# Patient Record
Sex: FEMALE | Race: OTHER | Employment: UNEMPLOYED | URBAN - METROPOLITAN AREA
[De-identification: names, ages, dates, MRNs, and addresses within clinical notes are randomized per-mention and may not be internally consistent; named-entity substitution may affect disease eponyms.]

---

## 2020-07-01 ENCOUNTER — OFFICE VISIT (OUTPATIENT)
Dept: SURGERY | Age: 45
End: 2020-07-01

## 2020-07-01 VITALS
DIASTOLIC BLOOD PRESSURE: 72 MMHG | WEIGHT: 170 LBS | HEART RATE: 75 BPM | BODY MASS INDEX: 31.28 KG/M2 | RESPIRATION RATE: 16 BRPM | SYSTOLIC BLOOD PRESSURE: 106 MMHG | OXYGEN SATURATION: 98 % | HEIGHT: 62 IN | TEMPERATURE: 98.2 F

## 2020-07-01 DIAGNOSIS — K43.2 INCISIONAL HERNIA, WITHOUT OBSTRUCTION OR GANGRENE: Primary | ICD-10-CM

## 2020-07-01 NOTE — PROGRESS NOTES
1. Have you been to the ER, urgent care clinic since your last visit? Hospitalized since your last visit? No    2. Have you seen or consulted any other health care providers outside of the [address] [place] [address] since your last visit? Include any pap smears or colon screening.  No

## 2020-07-02 NOTE — PROGRESS NOTES
[facility] at [facility] Surgery History and Physical    History of Present Illness:      Gabino Echevarria is a 39 y.o. female who has a past surgical history of open colectomy for colon cancer. This surgery was done in her home country of [place] a number of years ago. She is not sure which side the colon was taken on. Since then she has noticed an enlarging bulge in the midline. She has some pain and notices the bulge getting larger over time. The pain when she has it is a 4-5 out of 10 at worst.  She has not had any nausea vomiting changes in bowel movements. The bulge in the pain appear to be worse with straining lifting and bending over. PMH -colon cancer    PSH -open colon resection    No current outpatient medications on file. No Known Allergies    Social History     Socioeconomic History    Marital status: SINGLE     Spouse name: Not on file    Number of children: Not on file    Years of education: Not on file    Highest education level: Not on file   Occupational History    Not on file   Social Needs    Financial resource strain: Not on file    Food insecurity     Worry: Not on file     Inability: Not on file    Transportation needs     Medical: Not on file     Non-medical: Not on file   Tobacco Use    Smoking status: Former Smoker     Packs/day: 0.25    Smokeless tobacco: Never Used   Substance and Sexual Activity    Alcohol use:  Yes    Drug use: Never    Sexual activity: Yes   Lifestyle    Physical activity     Days per week: Not on file     Minutes per session: Not on file    Stress: Not on file   Relationships    Social connections     Talks on phone: Not on file     Gets together: Not on file     Attends Rastafarian service: Not on file     Active member of club or organization: Not on file     Attends meetings of clubs or organizations: Not on file     Relationship status: Not on file    Intimate partner violence     Fear of current or ex partner: Not on file     Emotionally abused: Not on file     Physically abused: Not on file     Forced sexual activity: Not on file   Other Topics Concern    Not on file   Social History Narrative    Not on file       No family history on file. ROS   Constitutional: negative  Ears, Nose, Mouth, Throat, and Face: negative  Respiratory: negative  Cardiovascular: negative  Gastrointestinal: positive for abdominal pain  Genitourinary:negative  Integument/Breast: negative  Hematologic/Lymphatic: negative  Behavioral/Psychiatric: negative  Allergic/Immunologic: negative      Physical Exam:     Visit Vitals  /72 (BP 1 Location: Left arm, BP Patient Position: Sitting)   Pulse 75   Temp 98.2 °F (36.8 °C) (Oral)   Resp 16   Ht 5' 2\" (1.575 m)   Wt 170 lb (77.1 kg)   SpO2 98%   BMI 31.09 kg/m²       General - alert and oriented, no apparent distress  HEENT - no jaundice, no hearing imparement  Pulm - CTAB, no C/W/R  CV - RRR, no M/R/G  Abd -soft, nondistended, bowel sounds present, midline incision is large with a bulge near and above the umbilicus that is soft and somewhat reducible, mild tenderness to palpation cannot exactly feel the hernia defect but feels about 4 cm or so in size, I cannot feel any other hernias along the length of the incision  Ext - pulses intact in UE and LE bilaterally, no edema  Skin - supple, no rashes  Psychiatric - normal affect, good mood    Labs  None    Imaging  None  I have reviewed and agree with all of the pertinent images    Assessment:     Wilfred Street is a 39 y.o. female with incisional hernia    Recommendations:     1. She certainly has an incisional hernia from her previous colon resection. We do not know which side of the colon or which part was taken. She will need a CT scan to figure out the size of the hernia which will determine what kind of surgery I do.   Given the size and location more than likely will be able to repair this robotically but the technique will be determined by the size and location. She also may have other smaller hernias in the area of the primary hernia due to her large midline incision. I will have her come back and see me in the office for office visit. She is [place]-speaking only and will need in person visit. Manjeet Parks MD    Ms. Oni Vicente has a reminder for a \"due or due soon\" health maintenance. I have asked that she contact her primary care provider for follow-up on this health maintenance.

## 2020-07-10 ENCOUNTER — HOSPITAL ENCOUNTER (OUTPATIENT)
Dept: CT IMAGING | Age: 45
Discharge: HOME | End: 2020-07-10
Attending: SURGERY
Payer: SUBSIDIZED

## 2020-07-10 DIAGNOSIS — K43.2 INCISIONAL HERNIA, WITHOUT OBSTRUCTION OR GANGRENE: ICD-10-CM

## 2020-07-10 LAB — CREAT BLD-MCNC: 0.4 MG/DL (ref 0.6–1.3)

## 2020-07-10 PROCEDURE — 74011000258 HC RX REV CODE- 258: Performed by: RADIOLOGY

## 2020-07-10 PROCEDURE — 82565 ASSAY OF CREATININE: CPT

## 2020-07-10 PROCEDURE — 74177 CT ABD & PELVIS W/CONTRAST: CPT

## 2020-07-10 PROCEDURE — 74011636320 HC RX REV CODE- 636/320: Performed by: RADIOLOGY

## 2020-07-10 RX ORDER — SODIUM CHLORIDE 0.9 % (FLUSH) 0.9 %
10 SYRINGE (ML) INJECTION
Status: COMPLETED | OUTPATIENT
Start: 2020-07-10 | End: 2020-07-10

## 2020-07-10 RX ADMIN — SODIUM CHLORIDE 100 ML: 900 INJECTION, SOLUTION INTRAVENOUS at 08:00

## 2020-07-10 RX ADMIN — Medication 10 ML: at 08:00

## 2020-07-10 RX ADMIN — IOPAMIDOL 100 ML: 755 INJECTION, SOLUTION INTRAVENOUS at 08:00

## 2020-07-21 ENCOUNTER — TELEPHONE (OUTPATIENT)
Dept: SURGERY | Age: 45
End: 2020-07-21

## 2020-08-05 ENCOUNTER — OFFICE VISIT (OUTPATIENT)
Dept: SURGERY | Age: 45
End: 2020-08-05
Payer: SUBSIDIZED

## 2020-08-05 VITALS
RESPIRATION RATE: 18 BRPM | SYSTOLIC BLOOD PRESSURE: 131 MMHG | WEIGHT: 168 LBS | HEIGHT: 62 IN | TEMPERATURE: 98.2 F | HEART RATE: 71 BPM | OXYGEN SATURATION: 98 % | BODY MASS INDEX: 30.91 KG/M2 | DIASTOLIC BLOOD PRESSURE: 82 MMHG

## 2020-08-05 DIAGNOSIS — K43.2 INCISIONAL HERNIA, WITHOUT OBSTRUCTION OR GANGRENE: Primary | ICD-10-CM

## 2020-08-05 PROCEDURE — 99214 OFFICE O/P EST MOD 30 MIN: CPT | Performed by: SURGERY

## 2020-08-05 NOTE — PROGRESS NOTES
1. Have you been to the ER, urgent care clinic since your last visit? Hospitalized since your last visit? No    2. Have you seen or consulted any other health care providers outside of the [address] [place] [address] since your last visit? Include any pap smears or colon screening.  No

## 2020-08-14 NOTE — PROGRESS NOTES
[facility] at [address] [place] [address] Surgery History and Physical    History of Present Illness:      Kristine Bermudez is a 39 y.o. female who has a past surgical history of open colectomy for colon cancer. This surgery was done in her home country of [place] a number of years ago. She is not sure which side the colon was taken on. Since then she has noticed an enlarging bulge in the midline. She has some pain and notices the bulge getting larger over time. The pain when she has it is a 4-5 out of 10 at worst.  She has not had any nausea vomiting changes in bowel movements. The bulge in the pain appear to be worse with straining lifting and bending over. She is here to follow-up after her CT scan and talk about surgery    Past medical history-none    Past surgical history-colectomy done in her home country of [place]    No current outpatient medications on file. No Known Allergies    Social History     Socioeconomic History    Marital status: SINGLE     Spouse name: Not on file    Number of children: Not on file    Years of education: Not on file    Highest education level: Not on file   Occupational History    Not on file   Social Needs    Financial resource strain: Not on file    Food insecurity     Worry: Not on file     Inability: Not on file    Transportation needs     Medical: Not on file     Non-medical: Not on file   Tobacco Use    Smoking status: Former Smoker     Packs/day: 0.25    Smokeless tobacco: Never Used   Substance and Sexual Activity    Alcohol use:  Yes    Drug use: Never    Sexual activity: Yes   Lifestyle    Physical activity     Days per week: Not on file     Minutes per session: Not on file    Stress: Not on file   Relationships    Social connections     Talks on phone: Not on file     Gets together: Not on file     Attends Muslim service: Not on file     Active member of club or organization: Not on file     Attends meetings of clubs or organizations: Not on file     Relationship status: Not on file    Intimate partner violence     Fear of current or ex partner: Not on file     Emotionally abused: Not on file     Physically abused: Not on file     Forced sexual activity: Not on file   Other Topics Concern    Not on file   Social History Narrative    Not on file       No family history on file. ROS   Constitutional: negative  Ears, Nose, Mouth, Throat, and Face: negative  Respiratory: negative  Cardiovascular: negative  Gastrointestinal: positive for abdominal pain  Genitourinary:negative  Integument/Breast: negative  Hematologic/Lymphatic: negative  Behavioral/Psychiatric: negative  Allergic/Immunologic: negative      Physical Exam:     Visit Vitals  /82 (BP 1 Location: Left arm, BP Patient Position: Sitting)   Pulse 71   Temp 98.2 °F (36.8 °C) (Oral)   Resp 18   Ht 5' 2\" (1.575 m)   Wt 168 lb (76.2 kg)   SpO2 98%   BMI 30.73 kg/m²       General - alert and oriented, no apparent distress  HEENT - no jaundice, no hearing imparement  Pulm - CTAB, no C/W/R  CV - RRR, no M/R/G  Abd -soft, nondistended, bowel sounds present, midline incisional hernia with possibly a few small defects  Ext - pulses intact in UE and LE bilaterally, no edema  Skin - supple, no rashes  Psychiatric - normal affect, good mood    Labs  None    Imaging  CT - FINDINGS:   LOWER THORAX: No significant abnormality in the incidentally imaged lower chest.  LIVER: No mass. There is hepatic steatosis  BILIARY TREE: Gallbladder surgically absent CBD is not dilated. SPLEEN: within normal limits. PANCREAS: No mass or ductal dilatation. ADRENALS: Unremarkable. KIDNEYS: No mass, calculus, or hydronephrosis. STOMACH: Unremarkable. SMALL BOWEL: No dilatation or wall thickening. COLON: Patient is status post resection of the splenic flexure  APPENDIX: Unremarkable. PERITONEUM: No ascites or pneumoperitoneum. RETROPERITONEUM: No lymphadenopathy or aortic aneurysm.   REPRODUCTIVE ORGANS: Patient status post hysterectomy  URINARY BLADDER: No mass or calculus. BONES: No destructive bone lesion. ABDOMINAL WALL: There are 5 ventral wall hernias superior to the umbilicus.      In the upper abdomen, there is a defect in the abdominal wall midline measuring  1.8 x 0.9 cm and contains fat.      Immediately inferior to this, there is another defect in the abdominal wall  measuring 2 x 1.7 cm and contains fat.     Inferior to this in the midline, there is a defect measuring 1.9 x 1.9 and  contains fat.     Inferior to this, there is a defect in the abdominal wall in the midline  measuring 1.3 x 0.5 cm which contains fat.     The largest defect is immediately superior and just to the left of the umbilicus  measuring 3.3 x 2.8 cm and contains fat.        ADDITIONAL COMMENTS: N/A     IMPRESSION  IMPRESSION:     1. There are 5 ventral wall hernias in the abdomen containing fat with the  largest immediately superior and just to left of the umbilicus measuring 3.3 x  2.8 cm. I have reviewed and agree with all of the pertinent images    Assessment:     Alec Boles is a 39 y.o. female with multiple incisional hernias    Recommendations:     1. She has multiple incisional hernias over the length of her incision the largest defect measures about 3 cm or so in size and go over a length of about 13 to 14 cm. I will plan on doing a robotic incisional hernia repair with mesh. I hope to do this with a preperitoneal approach for the hernia repair. If needed I can do is IPOM as well. I have discussed the above procedure with the patient in detail. We reviewed the benefits and possible complications of the surgery which include bleeding, infection, damage to adjacent organs, venous thromboembolism, need for repeat surgery, death and other unforseen complications. The patient agreed to proceed with the surgery. Jaclyn Zendejas MD    Ms. Jose G Judge has a reminder for a \"due or due soon\" health maintenance.  I have asked that she contact her primary care provider for follow-up on this health maintenance.

## 2020-08-19 ENCOUNTER — TELEPHONE (OUTPATIENT)
Dept: SURGERY | Age: 45
End: 2020-08-19

## 2020-08-19 NOTE — TELEPHONE ENCOUNTER
Confirmed surgery information / details with patient using  service rep name: [facility] ID# [id number]; patient verbalizes understanding. Letter sent.

## 2020-10-05 ENCOUNTER — HOSPITAL ENCOUNTER (OUTPATIENT)
Dept: PREADMISSION TESTING | Age: 45
Discharge: HOME | End: 2020-10-05
Payer: SUBSIDIZED

## 2020-10-05 VITALS
HEART RATE: 101 BPM | TEMPERATURE: 98.1 F | DIASTOLIC BLOOD PRESSURE: 82 MMHG | BODY MASS INDEX: 28.87 KG/M2 | HEIGHT: 63 IN | WEIGHT: 162.92 LBS | SYSTOLIC BLOOD PRESSURE: 148 MMHG

## 2020-10-05 LAB
ANION GAP SERPL CALC-SCNC: 6 MMOL/L (ref 5–15)
BASOPHILS # BLD: 0 K/UL (ref 0–0.1)
BASOPHILS NFR BLD: 1 % (ref 0–1)
BUN SERPL-MCNC: 10 MG/DL (ref 6–20)
BUN/CREAT SERPL: 14 (ref 12–20)
CALCIUM SERPL-MCNC: 10.3 MG/DL (ref 8.5–10.1)
CHLORIDE SERPL-SCNC: 101 MMOL/L (ref 97–108)
CO2 SERPL-SCNC: 25 MMOL/L (ref 21–32)
COMMENT, HOLDF: NORMAL
CREAT SERPL-MCNC: 0.69 MG/DL (ref 0.55–1.02)
DIFFERENTIAL METHOD BLD: NORMAL
EOSINOPHIL # BLD: 0.1 K/UL (ref 0–0.4)
EOSINOPHIL NFR BLD: 3 % (ref 0–7)
ERYTHROCYTE [DISTWIDTH] IN BLOOD BY AUTOMATED COUNT: 12.5 % (ref 11.5–14.5)
EST. AVERAGE GLUCOSE BLD GHB EST-MCNC: 214 MG/DL
GLUCOSE SERPL-MCNC: 261 MG/DL (ref 65–100)
HBA1C MFR BLD: 9.1 % (ref 4–5.6)
HCT VFR BLD AUTO: 37.4 % (ref 35–47)
HGB BLD-MCNC: 12.5 G/DL (ref 11.5–16)
IMM GRANULOCYTES # BLD AUTO: 0 K/UL (ref 0–0.04)
IMM GRANULOCYTES NFR BLD AUTO: 0 % (ref 0–0.5)
LYMPHOCYTES # BLD: 1.6 K/UL (ref 0.8–3.5)
LYMPHOCYTES NFR BLD: 31 % (ref 12–49)
MCH RBC QN AUTO: 28.2 PG (ref 26–34)
MCHC RBC AUTO-ENTMCNC: 33.4 G/DL (ref 30–36.5)
MCV RBC AUTO: 84.2 FL (ref 80–99)
MONOCYTES # BLD: 0.3 K/UL (ref 0–1)
MONOCYTES NFR BLD: 5 % (ref 5–13)
NEUTS SEG # BLD: 3.1 K/UL (ref 1.8–8)
NEUTS SEG NFR BLD: 60 % (ref 32–75)
NRBC # BLD: 0 K/UL (ref 0–0.01)
NRBC BLD-RTO: 0 PER 100 WBC
PLATELET # BLD AUTO: 294 K/UL (ref 150–400)
PMV BLD AUTO: 12.7 FL (ref 8.9–12.9)
POTASSIUM SERPL-SCNC: 4.2 MMOL/L (ref 3.5–5.1)
RBC # BLD AUTO: 4.44 M/UL (ref 3.8–5.2)
SAMPLES BEING HELD,HOLD: NORMAL
SODIUM SERPL-SCNC: 132 MMOL/L (ref 136–145)
WBC # BLD AUTO: 5.2 K/UL (ref 3.6–11)

## 2020-10-05 PROCEDURE — 36415 COLL VENOUS BLD VENIPUNCTURE: CPT

## 2020-10-05 PROCEDURE — 80048 BASIC METABOLIC PNL TOTAL CA: CPT

## 2020-10-05 PROCEDURE — 85025 COMPLETE CBC W/AUTO DIFF WBC: CPT

## 2020-10-05 PROCEDURE — 83036 HEMOGLOBIN GLYCOSYLATED A1C: CPT

## 2020-10-05 RX ORDER — GEMFIBROZIL 600 MG/1
600 TABLET, FILM COATED ORAL 2 TIMES DAILY
COMMUNITY

## 2020-10-05 RX ORDER — LEVOTHYROXINE SODIUM 75 UG/1
75 TABLET ORAL
COMMUNITY

## 2020-10-05 RX ORDER — CHLORPHENIRAMINE MALEATE 4 MG
1 TABLET ORAL
COMMUNITY

## 2020-10-05 RX ORDER — SITAGLIPTIN AND METFORMIN HYDROCHLORIDE 50; 1000 MG/1; MG/1
1 TABLET, FILM COATED ORAL 2 TIMES DAILY WITH MEALS
COMMUNITY

## 2020-10-05 NOTE — PERIOP NOTES
Preoperative instructions reviewed with patient. Patient given SSI infection FAQS sheet. Given two bottles of skin prep chlorhexidine soap; given written and verbal instructions on use. Patient was given the opportunity to ask questions on the information provided. INFORMATION REGARDING COVID 19 TESTING PRIOR TO SURGERY WAS PROVIDED TO THE PATIENT WITH THE OPPORTUNITY FOR QUESTIONS. PATIENT VERBALIZED UNDERSTANDING. PATIENT IS [place]ese SPEAKING.  # [id number] AND #[id number] WERE USED.

## 2020-10-06 ENCOUNTER — TRANSCRIBE ORDER (OUTPATIENT)
Dept: REGISTRATION | Age: 45
End: 2020-10-06

## 2020-10-06 DIAGNOSIS — Z01.812 PRE-PROCEDURE LAB EXAM: Primary | ICD-10-CM

## 2020-10-06 NOTE — PERIOP NOTES
Connect care message sent to TASNEEM Johnson/Dr. Sharmin Wilson office re: abnormal lab results sodium 132, serum glucose 261, HgbA1c 9.1.

## 2020-10-11 ENCOUNTER — HOSPITAL ENCOUNTER (OUTPATIENT)
Dept: PREADMISSION TESTING | Age: 45
Discharge: HOME | End: 2020-10-11
Payer: SUBSIDIZED

## 2020-10-11 DIAGNOSIS — Z01.812 PRE-PROCEDURE LAB EXAM: ICD-10-CM

## 2020-10-11 PROCEDURE — 87635 SARS-COV-2 COVID-19 AMP PRB: CPT

## 2020-10-12 LAB — SARS-COV-2, COV2NT: NOT DETECTED

## 2020-10-14 ENCOUNTER — ANESTHESIA EVENT (OUTPATIENT)
Dept: SURGERY | Age: 45
End: 2020-10-14
Payer: SUBSIDIZED

## 2020-10-15 ENCOUNTER — ANESTHESIA (OUTPATIENT)
Dept: SURGERY | Age: 45
End: 2020-10-15
Payer: SUBSIDIZED

## 2020-10-15 ENCOUNTER — HOSPITAL ENCOUNTER (OUTPATIENT)
Age: 45
Setting detail: OBSERVATION
Discharge: HOME | End: 2020-10-18
Attending: SURGERY | Admitting: SURGERY
Payer: SUBSIDIZED

## 2020-10-15 DIAGNOSIS — K43.2 INCISIONAL HERNIA, WITHOUT OBSTRUCTION OR GANGRENE: ICD-10-CM

## 2020-10-15 LAB
GLUCOSE BLD STRIP.AUTO-MCNC: 170 MG/DL (ref 65–100)
GLUCOSE BLD STRIP.AUTO-MCNC: 225 MG/DL (ref 65–100)
GLUCOSE BLD STRIP.AUTO-MCNC: 239 MG/DL (ref 65–100)
GLUCOSE BLD STRIP.AUTO-MCNC: 267 MG/DL (ref 65–100)
HCG UR QL: NEGATIVE
SERVICE CMNT-IMP: ABNORMAL

## 2020-10-15 PROCEDURE — 74011250636 HC RX REV CODE- 250/636: Performed by: ANESTHESIOLOGY

## 2020-10-15 PROCEDURE — 77030022704 HC SUT VLOC COVD -B: Performed by: SURGERY

## 2020-10-15 PROCEDURE — 77030016151 HC PROTCTR LNS DFOG COVD -B: Performed by: SURGERY

## 2020-10-15 PROCEDURE — 77030002933 HC SUT MCRYL J&J -A: Performed by: SURGERY

## 2020-10-15 PROCEDURE — 74011250636 HC RX REV CODE- 250/636: Performed by: SURGERY

## 2020-10-15 PROCEDURE — 77030010507 HC ADH SKN DERMBND J&J -B: Performed by: SURGERY

## 2020-10-15 PROCEDURE — 74011250636 HC RX REV CODE- 250/636: Performed by: NURSE ANESTHETIST, CERTIFIED REGISTERED

## 2020-10-15 PROCEDURE — 76010000881 HC OR TIME 4.5 TO 5HR INTENSV - TIER 2: Performed by: SURGERY

## 2020-10-15 PROCEDURE — 76060000040 HC ANESTHESIA 4.5 TO 5 HR: Performed by: SURGERY

## 2020-10-15 PROCEDURE — 77030002895 HC DEV VASC CLOSR COVD -B: Performed by: SURGERY

## 2020-10-15 PROCEDURE — 74011000250 HC RX REV CODE- 250: Performed by: NURSE ANESTHETIST, CERTIFIED REGISTERED

## 2020-10-15 PROCEDURE — 77030008684 HC TU ET CUF COVD -B: Performed by: NURSE ANESTHETIST, CERTIFIED REGISTERED

## 2020-10-15 PROCEDURE — 76210000017 HC OR PH I REC 1.5 TO 2 HR: Performed by: SURGERY

## 2020-10-15 PROCEDURE — 99218 HC RM OBSERVATION: CPT

## 2020-10-15 PROCEDURE — 77030026438 HC STYL ET INTUB CARD -A: Performed by: NURSE ANESTHETIST, CERTIFIED REGISTERED

## 2020-10-15 PROCEDURE — C1781 MESH (IMPLANTABLE): HCPCS | Performed by: SURGERY

## 2020-10-15 PROCEDURE — 77030035464 HC SUT VLOC NON ABS COVD -B: Performed by: SURGERY

## 2020-10-15 PROCEDURE — 81025 URINE PREGNANCY TEST: CPT

## 2020-10-15 PROCEDURE — 77030035277 HC OBTRTR BLDELSS DISP INTU -B: Performed by: SURGERY

## 2020-10-15 PROCEDURE — 74011636637 HC RX REV CODE- 636/637

## 2020-10-15 PROCEDURE — 82962 GLUCOSE BLOOD TEST: CPT

## 2020-10-15 PROCEDURE — 77030002912 HC SUT ETHBND J&J -A: Performed by: SURGERY

## 2020-10-15 PROCEDURE — 49654 PR LAP, INCISIONAL HERNIA REPAIR,REDUCIBLE: CPT | Performed by: SURGERY

## 2020-10-15 PROCEDURE — 2709999900 HC NON-CHARGEABLE SUPPLY: Performed by: SURGERY

## 2020-10-15 PROCEDURE — 77030040361 HC SLV COMPR DVT MDII -B: Performed by: SURGERY

## 2020-10-15 PROCEDURE — 74011000250 HC RX REV CODE- 250: Performed by: SURGERY

## 2020-10-15 PROCEDURE — 77030040830 HC CATH URETH FOL MDII -A: Performed by: SURGERY

## 2020-10-15 PROCEDURE — 74011636637 HC RX REV CODE- 636/637: Performed by: SURGERY

## 2020-10-15 PROCEDURE — 74011250637 HC RX REV CODE- 250/637: Performed by: ANESTHESIOLOGY

## 2020-10-15 DEVICE — BARD SOFT MESH, 12" X 12" (30. 5 CM X 30.5 CM)
Type: IMPLANTABLE DEVICE | Site: ABDOMEN | Status: FUNCTIONAL
Brand: BARD

## 2020-10-15 RX ORDER — GLYCOPYRROLATE 0.2 MG/ML
INJECTION INTRAMUSCULAR; INTRAVENOUS AS NEEDED
Status: DISCONTINUED | OUTPATIENT
Start: 2020-10-15 | End: 2020-10-15 | Stop reason: HOSPADM

## 2020-10-15 RX ORDER — ONDANSETRON 2 MG/ML
4 INJECTION INTRAMUSCULAR; INTRAVENOUS
Status: DISCONTINUED | OUTPATIENT
Start: 2020-10-15 | End: 2020-10-18 | Stop reason: HOSPADM

## 2020-10-15 RX ORDER — MIDAZOLAM HYDROCHLORIDE 1 MG/ML
1 INJECTION, SOLUTION INTRAMUSCULAR; INTRAVENOUS
Status: DISCONTINUED | OUTPATIENT
Start: 2020-10-15 | End: 2020-10-15 | Stop reason: HOSPADM

## 2020-10-15 RX ORDER — HYDROMORPHONE HYDROCHLORIDE 1 MG/ML
1 INJECTION, SOLUTION INTRAMUSCULAR; INTRAVENOUS; SUBCUTANEOUS
Status: DISCONTINUED | OUTPATIENT
Start: 2020-10-15 | End: 2020-10-18 | Stop reason: HOSPADM

## 2020-10-15 RX ORDER — SODIUM CHLORIDE 0.9 % (FLUSH) 0.9 %
5-40 SYRINGE (ML) INJECTION AS NEEDED
Status: DISCONTINUED | OUTPATIENT
Start: 2020-10-15 | End: 2020-10-15 | Stop reason: HOSPADM

## 2020-10-15 RX ORDER — OXYCODONE AND ACETAMINOPHEN 5; 325 MG/1; MG/1
1-2 TABLET ORAL
Status: DISCONTINUED | OUTPATIENT
Start: 2020-10-15 | End: 2020-10-18 | Stop reason: HOSPADM

## 2020-10-15 RX ORDER — ACETAMINOPHEN 325 MG/1
650 TABLET ORAL ONCE
Status: COMPLETED | OUTPATIENT
Start: 2020-10-15 | End: 2020-10-15

## 2020-10-15 RX ORDER — ENOXAPARIN SODIUM 100 MG/ML
40 INJECTION SUBCUTANEOUS EVERY 24 HOURS
Status: DISCONTINUED | OUTPATIENT
Start: 2020-10-16 | End: 2020-10-18 | Stop reason: HOSPADM

## 2020-10-15 RX ORDER — SODIUM CHLORIDE, SODIUM LACTATE, POTASSIUM CHLORIDE, CALCIUM CHLORIDE 600; 310; 30; 20 MG/100ML; MG/100ML; MG/100ML; MG/100ML
125 INJECTION, SOLUTION INTRAVENOUS CONTINUOUS
Status: DISCONTINUED | OUTPATIENT
Start: 2020-10-15 | End: 2020-10-15 | Stop reason: HOSPADM

## 2020-10-15 RX ORDER — PHENYLEPHRINE HCL IN 0.9% NACL 0.4MG/10ML
SYRINGE (ML) INTRAVENOUS
Status: DISCONTINUED | OUTPATIENT
Start: 2020-10-15 | End: 2020-10-15 | Stop reason: HOSPADM

## 2020-10-15 RX ORDER — DEXTROSE, SODIUM CHLORIDE, SODIUM LACTATE, POTASSIUM CHLORIDE, AND CALCIUM CHLORIDE 5; .6; .31; .03; .02 G/100ML; G/100ML; G/100ML; G/100ML; G/100ML
125 INJECTION, SOLUTION INTRAVENOUS CONTINUOUS
Status: DISCONTINUED | OUTPATIENT
Start: 2020-10-15 | End: 2020-10-15 | Stop reason: HOSPADM

## 2020-10-15 RX ORDER — DEXAMETHASONE SODIUM PHOSPHATE 4 MG/ML
INJECTION, SOLUTION INTRA-ARTICULAR; INTRALESIONAL; INTRAMUSCULAR; INTRAVENOUS; SOFT TISSUE AS NEEDED
Status: DISCONTINUED | OUTPATIENT
Start: 2020-10-15 | End: 2020-10-15 | Stop reason: HOSPADM

## 2020-10-15 RX ORDER — MIDAZOLAM HYDROCHLORIDE 1 MG/ML
1 INJECTION, SOLUTION INTRAMUSCULAR; INTRAVENOUS AS NEEDED
Status: DISCONTINUED | OUTPATIENT
Start: 2020-10-15 | End: 2020-10-15 | Stop reason: HOSPADM

## 2020-10-15 RX ORDER — FENTANYL CITRATE 50 UG/ML
50 INJECTION, SOLUTION INTRAMUSCULAR; INTRAVENOUS AS NEEDED
Status: DISCONTINUED | OUTPATIENT
Start: 2020-10-15 | End: 2020-10-15 | Stop reason: HOSPADM

## 2020-10-15 RX ORDER — DEXMEDETOMIDINE HYDROCHLORIDE 100 UG/ML
INJECTION, SOLUTION INTRAVENOUS AS NEEDED
Status: DISCONTINUED | OUTPATIENT
Start: 2020-10-15 | End: 2020-10-15 | Stop reason: HOSPADM

## 2020-10-15 RX ORDER — DIPHENHYDRAMINE HYDROCHLORIDE 50 MG/ML
12.5 INJECTION, SOLUTION INTRAMUSCULAR; INTRAVENOUS
Status: ACTIVE | OUTPATIENT
Start: 2020-10-15 | End: 2020-10-16

## 2020-10-15 RX ORDER — MIDAZOLAM HYDROCHLORIDE 1 MG/ML
INJECTION, SOLUTION INTRAMUSCULAR; INTRAVENOUS AS NEEDED
Status: DISCONTINUED | OUTPATIENT
Start: 2020-10-15 | End: 2020-10-15 | Stop reason: HOSPADM

## 2020-10-15 RX ORDER — MORPHINE SULFATE 10 MG/ML
2 INJECTION, SOLUTION INTRAMUSCULAR; INTRAVENOUS
Status: DISCONTINUED | OUTPATIENT
Start: 2020-10-15 | End: 2020-10-15 | Stop reason: HOSPADM

## 2020-10-15 RX ORDER — LIDOCAINE HYDROCHLORIDE 10 MG/ML
0.5 INJECTION, SOLUTION EPIDURAL; INFILTRATION; INTRACAUDAL; PERINEURAL AS NEEDED
Status: DISCONTINUED | OUTPATIENT
Start: 2020-10-15 | End: 2020-10-15 | Stop reason: HOSPADM

## 2020-10-15 RX ORDER — NEOSTIGMINE METHYLSULFATE 1 MG/ML
INJECTION, SOLUTION INTRAVENOUS AS NEEDED
Status: DISCONTINUED | OUTPATIENT
Start: 2020-10-15 | End: 2020-10-15 | Stop reason: HOSPADM

## 2020-10-15 RX ORDER — OXYCODONE HYDROCHLORIDE 5 MG/1
5 TABLET ORAL AS NEEDED
Status: DISCONTINUED | OUTPATIENT
Start: 2020-10-15 | End: 2020-10-15 | Stop reason: HOSPADM

## 2020-10-15 RX ORDER — BUPIVACAINE HYDROCHLORIDE AND EPINEPHRINE 5; 5 MG/ML; UG/ML
INJECTION, SOLUTION EPIDURAL; INTRACAUDAL; PERINEURAL AS NEEDED
Status: DISCONTINUED | OUTPATIENT
Start: 2020-10-15 | End: 2020-10-15 | Stop reason: HOSPADM

## 2020-10-15 RX ORDER — SODIUM CHLORIDE 0.9 % (FLUSH) 0.9 %
5-40 SYRINGE (ML) INJECTION AS NEEDED
Status: DISCONTINUED | OUTPATIENT
Start: 2020-10-15 | End: 2020-10-18 | Stop reason: HOSPADM

## 2020-10-15 RX ORDER — SODIUM CHLORIDE, SODIUM LACTATE, POTASSIUM CHLORIDE, CALCIUM CHLORIDE 600; 310; 30; 20 MG/100ML; MG/100ML; MG/100ML; MG/100ML
INJECTION, SOLUTION INTRAVENOUS
Status: DISCONTINUED | OUTPATIENT
Start: 2020-10-15 | End: 2020-10-15

## 2020-10-15 RX ORDER — INSULIN LISPRO 100 [IU]/ML
INJECTION, SOLUTION INTRAVENOUS; SUBCUTANEOUS
Status: DISCONTINUED | OUTPATIENT
Start: 2020-10-15 | End: 2020-10-18 | Stop reason: HOSPADM

## 2020-10-15 RX ORDER — SODIUM CHLORIDE 0.9 % (FLUSH) 0.9 %
5-40 SYRINGE (ML) INJECTION EVERY 8 HOURS
Status: DISCONTINUED | OUTPATIENT
Start: 2020-10-15 | End: 2020-10-18 | Stop reason: HOSPADM

## 2020-10-15 RX ORDER — KETAMINE HYDROCHLORIDE 10 MG/ML
INJECTION, SOLUTION INTRAMUSCULAR; INTRAVENOUS AS NEEDED
Status: DISCONTINUED | OUTPATIENT
Start: 2020-10-15 | End: 2020-10-15 | Stop reason: HOSPADM

## 2020-10-15 RX ORDER — KETOROLAC TROMETHAMINE 30 MG/ML
15 INJECTION, SOLUTION INTRAMUSCULAR; INTRAVENOUS EVERY 6 HOURS
Status: COMPLETED | OUTPATIENT
Start: 2020-10-15 | End: 2020-10-17

## 2020-10-15 RX ORDER — SODIUM CHLORIDE, SODIUM LACTATE, POTASSIUM CHLORIDE, CALCIUM CHLORIDE 600; 310; 30; 20 MG/100ML; MG/100ML; MG/100ML; MG/100ML
75 INJECTION, SOLUTION INTRAVENOUS CONTINUOUS
Status: DISCONTINUED | OUTPATIENT
Start: 2020-10-15 | End: 2020-10-18 | Stop reason: HOSPADM

## 2020-10-15 RX ORDER — MAGNESIUM SULFATE 100 %
4 CRYSTALS MISCELLANEOUS AS NEEDED
Status: DISCONTINUED | OUTPATIENT
Start: 2020-10-15 | End: 2020-10-18 | Stop reason: HOSPADM

## 2020-10-15 RX ORDER — SODIUM CHLORIDE 0.9 % (FLUSH) 0.9 %
5-40 SYRINGE (ML) INJECTION EVERY 8 HOURS
Status: DISCONTINUED | OUTPATIENT
Start: 2020-10-15 | End: 2020-10-15 | Stop reason: HOSPADM

## 2020-10-15 RX ORDER — PROPOFOL 10 MG/ML
INJECTION, EMULSION INTRAVENOUS AS NEEDED
Status: DISCONTINUED | OUTPATIENT
Start: 2020-10-15 | End: 2020-10-15 | Stop reason: HOSPADM

## 2020-10-15 RX ORDER — CEFAZOLIN SODIUM/WATER 2 G/20 ML
2 SYRINGE (ML) INTRAVENOUS ONCE
Status: COMPLETED | OUTPATIENT
Start: 2020-10-15 | End: 2020-10-15

## 2020-10-15 RX ORDER — INSULIN LISPRO 100 [IU]/ML
INJECTION, SOLUTION INTRAVENOUS; SUBCUTANEOUS
Status: COMPLETED
Start: 2020-10-15 | End: 2020-10-15

## 2020-10-15 RX ORDER — FENTANYL CITRATE 50 UG/ML
INJECTION, SOLUTION INTRAMUSCULAR; INTRAVENOUS AS NEEDED
Status: DISCONTINUED | OUTPATIENT
Start: 2020-10-15 | End: 2020-10-15 | Stop reason: HOSPADM

## 2020-10-15 RX ORDER — LIDOCAINE HYDROCHLORIDE 20 MG/ML
INJECTION, SOLUTION EPIDURAL; INFILTRATION; INTRACAUDAL; PERINEURAL AS NEEDED
Status: DISCONTINUED | OUTPATIENT
Start: 2020-10-15 | End: 2020-10-15 | Stop reason: HOSPADM

## 2020-10-15 RX ORDER — SUCCINYLCHOLINE CHLORIDE 20 MG/ML
INJECTION INTRAMUSCULAR; INTRAVENOUS AS NEEDED
Status: DISCONTINUED | OUTPATIENT
Start: 2020-10-15 | End: 2020-10-15 | Stop reason: HOSPADM

## 2020-10-15 RX ORDER — HYDROMORPHONE HYDROCHLORIDE 2 MG/ML
INJECTION, SOLUTION INTRAMUSCULAR; INTRAVENOUS; SUBCUTANEOUS AS NEEDED
Status: DISCONTINUED | OUTPATIENT
Start: 2020-10-15 | End: 2020-10-15 | Stop reason: HOSPADM

## 2020-10-15 RX ORDER — DIPHENHYDRAMINE HYDROCHLORIDE 50 MG/ML
12.5 INJECTION, SOLUTION INTRAMUSCULAR; INTRAVENOUS AS NEEDED
Status: DISCONTINUED | OUTPATIENT
Start: 2020-10-15 | End: 2020-10-15 | Stop reason: HOSPADM

## 2020-10-15 RX ORDER — DEXTROSE MONOHYDRATE 100 MG/ML
0-250 INJECTION, SOLUTION INTRAVENOUS AS NEEDED
Status: DISCONTINUED | OUTPATIENT
Start: 2020-10-15 | End: 2020-10-18 | Stop reason: HOSPADM

## 2020-10-15 RX ORDER — ONDANSETRON 2 MG/ML
INJECTION INTRAMUSCULAR; INTRAVENOUS AS NEEDED
Status: DISCONTINUED | OUTPATIENT
Start: 2020-10-15 | End: 2020-10-15 | Stop reason: HOSPADM

## 2020-10-15 RX ORDER — ROCURONIUM BROMIDE 10 MG/ML
INJECTION, SOLUTION INTRAVENOUS AS NEEDED
Status: DISCONTINUED | OUTPATIENT
Start: 2020-10-15 | End: 2020-10-15 | Stop reason: HOSPADM

## 2020-10-15 RX ORDER — ONDANSETRON 2 MG/ML
4 INJECTION INTRAMUSCULAR; INTRAVENOUS AS NEEDED
Status: DISCONTINUED | OUTPATIENT
Start: 2020-10-15 | End: 2020-10-15 | Stop reason: HOSPADM

## 2020-10-15 RX ORDER — FENTANYL CITRATE 50 UG/ML
25 INJECTION, SOLUTION INTRAMUSCULAR; INTRAVENOUS
Status: DISCONTINUED | OUTPATIENT
Start: 2020-10-15 | End: 2020-10-15 | Stop reason: HOSPADM

## 2020-10-15 RX ORDER — NALOXONE HYDROCHLORIDE 0.4 MG/ML
0.4 INJECTION, SOLUTION INTRAMUSCULAR; INTRAVENOUS; SUBCUTANEOUS AS NEEDED
Status: DISCONTINUED | OUTPATIENT
Start: 2020-10-15 | End: 2020-10-18 | Stop reason: HOSPADM

## 2020-10-15 RX ADMIN — DEXMEDETOMIDINE HYDROCHLORIDE 4 MCG: 100 INJECTION, SOLUTION, CONCENTRATE INTRAVENOUS at 08:57

## 2020-10-15 RX ADMIN — SODIUM CHLORIDE, SODIUM LACTATE, POTASSIUM CHLORIDE, AND CALCIUM CHLORIDE: 600; 310; 30; 20 INJECTION, SOLUTION INTRAVENOUS at 07:56

## 2020-10-15 RX ADMIN — SUCCINYLCHOLINE CHLORIDE 160 MG: 20 INJECTION, SOLUTION INTRAMUSCULAR; INTRAVENOUS at 08:00

## 2020-10-15 RX ADMIN — MEPERIDINE HYDROCHLORIDE 12.5 MG: 25 INJECTION INTRAMUSCULAR; INTRAVENOUS; SUBCUTANEOUS at 13:30

## 2020-10-15 RX ADMIN — ROCURONIUM BROMIDE 10 MG: 10 SOLUTION INTRAVENOUS at 08:00

## 2020-10-15 RX ADMIN — INSULIN LISPRO 5 UNITS: 100 INJECTION, SOLUTION INTRAVENOUS; SUBCUTANEOUS at 13:19

## 2020-10-15 RX ADMIN — DEXAMETHASONE SODIUM PHOSPHATE 8 MG: 4 INJECTION, SOLUTION INTRAMUSCULAR; INTRAVENOUS at 08:06

## 2020-10-15 RX ADMIN — KETOROLAC TROMETHAMINE 15 MG: 30 INJECTION, SOLUTION INTRAMUSCULAR at 17:32

## 2020-10-15 RX ADMIN — KETAMINE HYDROCHLORIDE 20 MG: 10 INJECTION, SOLUTION INTRAMUSCULAR; INTRAVENOUS at 08:55

## 2020-10-15 RX ADMIN — ROCURONIUM BROMIDE 10 MG: 10 SOLUTION INTRAVENOUS at 10:45

## 2020-10-15 RX ADMIN — SODIUM CHLORIDE, SODIUM LACTATE, POTASSIUM CHLORIDE, AND CALCIUM CHLORIDE: 600; 310; 30; 20 INJECTION, SOLUTION INTRAVENOUS at 10:48

## 2020-10-15 RX ADMIN — PROPOFOL 150 MG: 10 INJECTION, EMULSION INTRAVENOUS at 08:00

## 2020-10-15 RX ADMIN — ROCURONIUM BROMIDE 10 MG: 10 SOLUTION INTRAVENOUS at 09:21

## 2020-10-15 RX ADMIN — Medication 10 ML: at 17:33

## 2020-10-15 RX ADMIN — MIDAZOLAM 2 MG: 1 INJECTION INTRAMUSCULAR; INTRAVENOUS at 07:56

## 2020-10-15 RX ADMIN — ROCURONIUM BROMIDE 10 MG: 10 SOLUTION INTRAVENOUS at 11:08

## 2020-10-15 RX ADMIN — KETOROLAC TROMETHAMINE 15 MG: 30 INJECTION, SOLUTION INTRAMUSCULAR at 23:41

## 2020-10-15 RX ADMIN — ACETAMINOPHEN 650 MG: 325 TABLET ORAL at 06:37

## 2020-10-15 RX ADMIN — KETAMINE HYDROCHLORIDE 20 MG: 10 INJECTION, SOLUTION INTRAMUSCULAR; INTRAVENOUS at 08:12

## 2020-10-15 RX ADMIN — FENTANYL CITRATE 100 MCG: 50 INJECTION, SOLUTION INTRAMUSCULAR; INTRAVENOUS at 08:00

## 2020-10-15 RX ADMIN — ONDANSETRON HYDROCHLORIDE 4 MG: 2 INJECTION, SOLUTION INTRAMUSCULAR; INTRAVENOUS at 12:35

## 2020-10-15 RX ADMIN — SODIUM CHLORIDE, SODIUM LACTATE, POTASSIUM CHLORIDE, AND CALCIUM CHLORIDE 125 ML/HR: 600; 310; 30; 20 INJECTION, SOLUTION INTRAVENOUS at 06:34

## 2020-10-15 RX ADMIN — SODIUM CHLORIDE, SODIUM LACTATE, POTASSIUM CHLORIDE, AND CALCIUM CHLORIDE 75 ML/HR: 600; 310; 30; 20 INJECTION, SOLUTION INTRAVENOUS at 19:21

## 2020-10-15 RX ADMIN — DEXMEDETOMIDINE HYDROCHLORIDE 4 MCG: 100 INJECTION, SOLUTION, CONCENTRATE INTRAVENOUS at 09:22

## 2020-10-15 RX ADMIN — DEXMEDETOMIDINE HYDROCHLORIDE 4 MCG: 100 INJECTION, SOLUTION, CONCENTRATE INTRAVENOUS at 08:43

## 2020-10-15 RX ADMIN — HYDROMORPHONE HYDROCHLORIDE 1 MG: 1 INJECTION, SOLUTION INTRAMUSCULAR; INTRAVENOUS; SUBCUTANEOUS at 15:31

## 2020-10-15 RX ADMIN — ROCURONIUM BROMIDE 30 MG: 10 SOLUTION INTRAVENOUS at 08:06

## 2020-10-15 RX ADMIN — Medication 40 MCG/MIN: at 08:10

## 2020-10-15 RX ADMIN — DEXMEDETOMIDINE HYDROCHLORIDE 4 MCG: 100 INJECTION, SOLUTION, CONCENTRATE INTRAVENOUS at 09:43

## 2020-10-15 RX ADMIN — Medication 2 G: at 12:06

## 2020-10-15 RX ADMIN — Medication 3 MG: at 12:35

## 2020-10-15 RX ADMIN — INSULIN LISPRO 3 UNITS: 100 INJECTION, SOLUTION INTRAVENOUS; SUBCUTANEOUS at 17:31

## 2020-10-15 RX ADMIN — LIDOCAINE HYDROCHLORIDE 60 MG: 20 INJECTION, SOLUTION EPIDURAL; INFILTRATION; INTRACAUDAL; PERINEURAL at 08:00

## 2020-10-15 RX ADMIN — Medication 2 G: at 08:06

## 2020-10-15 RX ADMIN — HYDROMORPHONE HYDROCHLORIDE 1 MG: 2 INJECTION, SOLUTION INTRAMUSCULAR; INTRAVENOUS; SUBCUTANEOUS at 08:55

## 2020-10-15 RX ADMIN — GLYCOPYRROLATE 0.4 MG: 0.2 INJECTION, SOLUTION INTRAMUSCULAR; INTRAVENOUS at 08:25

## 2020-10-15 RX ADMIN — SODIUM CHLORIDE, SODIUM LACTATE, POTASSIUM CHLORIDE, AND CALCIUM CHLORIDE 75 ML/HR: 600; 310; 30; 20 INJECTION, SOLUTION INTRAVENOUS at 13:30

## 2020-10-15 RX ADMIN — HYDROMORPHONE HYDROCHLORIDE 1 MG: 2 INJECTION, SOLUTION INTRAMUSCULAR; INTRAVENOUS; SUBCUTANEOUS at 09:58

## 2020-10-15 RX ADMIN — ROCURONIUM BROMIDE 20 MG: 10 SOLUTION INTRAVENOUS at 10:22

## 2020-10-15 RX ADMIN — HYDROMORPHONE HYDROCHLORIDE 1 MG: 1 INJECTION, SOLUTION INTRAMUSCULAR; INTRAVENOUS; SUBCUTANEOUS at 19:24

## 2020-10-15 RX ADMIN — KETAMINE HYDROCHLORIDE 10 MG: 10 INJECTION, SOLUTION INTRAMUSCULAR; INTRAVENOUS at 09:58

## 2020-10-15 RX ADMIN — DEXMEDETOMIDINE HYDROCHLORIDE 4 MCG: 100 INJECTION, SOLUTION, CONCENTRATE INTRAVENOUS at 08:06

## 2020-10-15 RX ADMIN — GLYCOPYRROLATE 0.4 MG: 0.2 INJECTION, SOLUTION INTRAMUSCULAR; INTRAVENOUS at 12:35

## 2020-10-15 RX ADMIN — ROCURONIUM BROMIDE 10 MG: 10 SOLUTION INTRAVENOUS at 10:01

## 2020-10-15 NOTE — H&P
[address] [place] [address] Surgical Specialists at [facility] Surgery History and Physical     History of Present Illness:      Jesús Duong is a 39 y.o. female who has a past surgical history of open colectomy for colon cancer.  This surgery was done in her home country of [place] a number of years ago. Janene Mike is not sure which side the colon was taken on.  Since then she has noticed an enlarging bulge in the midline.  She has some pain and notices the bulge getting larger over time.  The pain when she has it is a 4-5 out of 10 at worst.  She has not had any nausea vomiting changes in bowel movements.  The bulge in the pain appear to be worse with straining lifting and bending over.     She is here to follow-up after her CT scan and talk about surgery     Past medical history-none     Past surgical history-colectomy done in her [place] country of [place]     No current outpatient medications on file.     No Known Allergies     Social History            Socioeconomic History    Marital status: SINGLE       Spouse name: Not on file    Number of children: Not on file    Years of education: Not on file    Highest education level: Not on file   Occupational History    Not on file   Social Needs    Financial resource strain: Not on file    Food insecurity       Worry: Not on file       Inability: Not on file    Transportation needs       Medical: Not on file       Non-medical: Not on file   Tobacco Use    Smoking status: Former Smoker       Packs/day: 0.25    Smokeless tobacco: Never Used   Substance and Sexual Activity    Alcohol use:  Yes    Drug use: Never    Sexual activity: Yes   Lifestyle    Physical activity       Days per week: Not on file       Minutes per session: Not on file    Stress: Not on file   Relationships    Social connections       Talks on phone: Not on file       Gets together: Not on file       Attends Latter-day service: Not on file       Active member of club or organization: Not on file       Attends meetings of clubs or organizations: Not on file       Relationship status: Not on file    Intimate partner violence       Fear of current or ex partner: Not on file       Emotionally abused: Not on file       Physically abused: Not on file       Forced sexual activity: Not on file   Other Topics Concern    Not on file   Social History Narrative    Not on file         No family history on file.     ROS   Constitutional: negative  Ears, Nose, Mouth, Throat, and Face: negative  Respiratory: negative  Cardiovascular: negative  Gastrointestinal: positive for abdominal pain  Genitourinary:negative  Integument/Breast: negative  Hematologic/Lymphatic: negative  Behavioral/Psychiatric: negative  Allergic/Immunologic: negative        Physical Exam:      Visit Vitals  /82 (BP 1 Location: Left arm, BP Patient Position: Sitting)   Pulse 71   Temp 98.2 °F (36.8 °C) (Oral)   Resp 18   Ht 5' 2\" (1.575 m)   Wt 168 lb (76.2 kg)   SpO2 98%   BMI 30.73 kg/m²         General - alert and oriented, no apparent distress  HEENT - no jaundice, no hearing imparement  Pulm - CTAB, no C/W/R  CV - RRR, no M/R/G  Abd -soft, nondistended, bowel sounds present, midline incisional hernia with possibly a few small defects  Ext - pulses intact in UE and LE bilaterally, no edema  Skin - supple, no rashes  Psychiatric - normal affect, good mood     Labs  None     Imaging  CT - FINDINGS:   LOWER THORAX: No significant abnormality in the incidentally imaged lower chest.  LIVER: No mass. There is hepatic steatosis  BILIARY TREE: Gallbladder surgically absent CBD is not dilated. SPLEEN: within normal limits. PANCREAS: No mass or ductal dilatation. ADRENALS: Unremarkable. KIDNEYS: No mass, calculus, or hydronephrosis. STOMACH: Unremarkable. SMALL BOWEL: No dilatation or wall thickening. COLON: Patient is status post resection of the splenic flexure  APPENDIX: Unremarkable. PERITONEUM: No ascites or pneumoperitoneum.   RETROPERITONEUM: No lymphadenopathy or aortic aneurysm. REPRODUCTIVE ORGANS: Patient status post hysterectomy  URINARY BLADDER: No mass or calculus. BONES: No destructive bone lesion. ABDOMINAL WALL: There are 5 ventral wall hernias superior to the umbilicus.      In the upper abdomen, there is a defect in the abdominal wall midline measuring  1.8 x 0.9 cm and contains fat.      Immediately inferior to this, there is another defect in the abdominal wall  measuring 2 x 1.7 cm and contains fat.     Inferior to this in the midline, there is a defect measuring 1.9 x 1.9 and  contains fat.     Inferior to this, there is a defect in the abdominal wall in the midline  measuring 1.3 x 0.5 cm which contains fat.     The largest defect is immediately superior and just to the left of the umbilicus  measuring 3.3 x 2.8 cm and contains fat.        ADDITIONAL COMMENTS: N/A     IMPRESSION  IMPRESSION:     1. There are 5 ventral wall hernias in the abdomen containing fat with the  largest immediately superior and just to left of the umbilicus measuring 3.3 x  2.8 cm. I have reviewed and agree with all of the pertinent images     Assessment:      Solange Ozuna is a 39 y.o. female with multiple incisional hernias     Recommendations:      1. She has multiple incisional hernias over the length of her incision the largest defect measures about 3 cm or so in size and go over a length of about 13 to 14 cm. I will plan on doing a robotic incisional hernia repair with mesh. I hope to do this with a preperitoneal approach for the hernia repair. If needed I can do is IPOM as well. I have discussed the above procedure with the patient in detail. We reviewed the benefits and possible complications of the surgery which include bleeding, infection, damage to adjacent organs, venous thromboembolism, need for repeat surgery, death and other unforseen complications.   The patient agreed to proceed with the surgery.          Norberto Apple MD

## 2020-10-15 NOTE — PERIOP NOTES
MILDRED FROM [place] [employer]. SET ON 3. PAD ON LEFT LATERAL THIGH. PRE PAD SITE CLEAR, NO REDNESS OR SWELLING NOTED.

## 2020-10-15 NOTE — BRIEF OP NOTE
Brief Postoperative Note    Patient: Fatou Taylor  YOB: 1975  MRN: [id number]    Date of Procedure: 10/15/2020     Pre-Op Diagnosis: INCISIONAL HERNIA    Post-Op Diagnosis: Same as preoperative diagnosis. Procedure(s):   ROBOTIC RETRORECTUS INCISIONAL HERNIA REPAIR WITH MESH AND LYSIS OF ADHESIONS GREATER THAN 90min    Surgeon(s):  Ksenia Gonsalves MD    Surgical Assistant: Mk Heck    Anesthesia: General     Estimated Blood Loss (mL): less than 025     Complications: None    Specimens: * No specimens in log *     Implants:   Implant Name Type Inv.  Item Serial No.  Lot No. LRB No. Used Action   MESH TRAVIS SFT [id number] --  - SNA Mesh MESH TRAVIS SFT [id number] --  NA BARD DAVOL_WD [id number] N/A 1 Implanted       Drains: * No LDAs found *    Findings: multiple incisional hernias over the midline, 12cm area midline, largest 3cm wide, retrorectus repair with primary closure and [id number] Bard soft mesh in the retrorectus space    Electronically Signed by Brigida Ca MD on 10/15/2020 at 12:23 PM

## 2020-10-15 NOTE — ANESTHESIA PREPROCEDURE EVALUATION
Relevant Problems   No relevant active problems       Anesthetic History   No history of anesthetic complications            Review of Systems / Medical History  Patient summary reviewed, nursing notes reviewed and pertinent labs reviewed    Pulmonary  Within defined limits                 Neuro/Psych   Within defined limits           Cardiovascular  Within defined limits                     GI/Hepatic/Renal  Within defined limits              Endo/Other  Within defined limits  Diabetes  Hypothyroidism  Cancer     Other Findings              Physical Exam    Airway  Mallampati: II  TM Distance: > 6 cm  Neck ROM: normal range of motion   Mouth opening: Normal     Cardiovascular  Regular rate and rhythm,  S1 and S2 normal,  no murmur, click, rub, or gallop             Dental  No notable dental hx       Pulmonary  Breath sounds clear to auscultation               Abdominal  GI exam deferred       Other Findings            Anesthetic Plan    ASA: 2  Anesthesia type: general          Induction: Intravenous  Anesthetic plan and risks discussed with: Patient

## 2020-10-15 NOTE — PERIOP NOTES
TRANSFER - OUT REPORT:    Verbal report given to Bhavna Mcdonnell RN(name) on Geeta Mendez  being transferred to 506(unit) for routine post - op       Report consisted of patients Situation, Background, Assessment and   Recommendations(SBAR). Time Pre op antibiotic given  :8:06 and 12:06 Ancef  Anesthesia Stop time: 12:54  Forbes Present on Transfer to floor:no  Order for Forbes on Chart:no  Discharge Prescriptions with Chart:no    Information from the following report(s) SBAR, Kardex, OR Summary, Procedure Summary, Intake/Output, MAR, Recent Results, Med Rec Status and Cardiac Rhythm SR was reviewed with the receiving nurse. Opportunity for questions and clarification was provided. Is the patient on 02? NO       L/Min        Other     Is the patient on a monitor? NO    Is the nurse transporting with the patient? NO    Surgical Waiting Area notified of patient's transfer from PACU? YES      The following personal items collected during your admission accompanied patient upon transfer:   Dental Appliance: Dental Appliances: None  Vision:    Hearing Aid:    Jewelry: Jewelry: None  Clothing: Clothing: (bag of clothes returned to pt.)  Other Valuables:  Other Valuables: None  Valuables sent to safe:

## 2020-10-15 NOTE — OP NOTES
[address]   OPERATIVE REPORT    Name:  Robert Brownlee  MR#:  [id number]  :  1975  ACCOUNT #:  [de-identified]  DATE OF SERVICE:  10/15/2020    PREOPERATIVE DIAGNOSIS:  Incisional hernia. POSTOPERATIVE DIAGNOSIS:  Incisional hernia. PROCEDURES PERFORMED:  Robotic retrorectus incisional hernia repair with mesh and lysis of adhesions greater than 90 minutes. SURGEON:  Jamil Sheth MD    ASSISTANT:  Shazia Villela SA    ANESTHESIA:  General.    COMPLICATIONS:  None. SPECIMENS REMOVED:  None. IMPLANTS:  15 x 22-cm Bard soft mesh. ESTIMATED BLOOD LOSS:  Less than 100 mL. DRAINS:  None. FINDINGS:  Multiple incisional hernias over the midline over an area about 20 cm vertically largest area was about 3 cm wide. Retrorectus repair was performed with primary closure and a 15 x 22-cm Bard soft mesh placed in the retrorectus plane. INDICATIONS FOR THE OPERATION:  The patient is a 68-year-old female who has an incisional hernia from previous colectomy which was done in her home country of [place] who presents with multiple small incisional hernias starting from below the upper abdomen down to the umbilicus, largest being about 3 cm wide, which is needing robotic repair in the operating room with mesh. DESCRIPTION OF THE OPERATION:  The patient was met in the preop holding area. The H and P was updated. Consent was signed. All risks and benefits were explained to the patient prior to the start of the operation. She was taken back to the operating room. She was lying in a supine position. The abdomen was prepped and draped in standard sterile fashion. Time-out was called. Antibiotics were given. SCDs were on lower extremities. Started the operation by making a small incision into the right upper quadrant, inserting a Veress needle into the intra-abdominal cavity, insufflating to 15 mmHg.   We then placed an 8-mm da Domenic trocar into the right lower quadrant, another one in the lower midline and then another 8-mm trocar in the left lower quadrant. We then docked the [employer] robot onto the patient and started taking down adhesions in the lower midline. There were number of adhesions in the anterior abdominal wall. Most of these were fatty omental adhesions which we were able to take down and reduced the omental fat going into the hernia defects. There were multiple hernia defects starting at the umbilicus, going all the way into the upper abdomen and along the midline over upper incision. We got down all these adhesions and cleared space to be able to do the hernia repair. This took about an hour and half to do. Once we did that, we connected the dots between some of the incisional hernias. There were many, many incisional hernias in the midline which we would open up and make more into one larger hernia. The hernia defects were up to about 3 cm wide but vertically took up the space of about 12 cm. We then started to dissect into the preperitoneal plane in the midline and then dissecting into the retrorectus plane on the patient's right side. We dissected the preperitoneal plane, starting in the upper abdomen and eventually moving that down into the lower abdomen past our incisional hernias to make sure we would have plenty of overlap. We dissected into that retrorectus plane going from medial to lateral to the termination of the retrorectus plane through the entire length of our hernia defect. We would repeat the process on the left side, dissecting into that preperitoneal plane and then into the retrorectus plane in the upper portion of the abdomen. We were dissecting that plane about 5 cm above and 5 cm below the hernias. We dissected into that retrorectus plane going from medial to lateral and going all the way to the termination of the left retrorectus plane and opening up that space very widely.   With that space completely opened up now, we then dropped our pneumoperitoneum down to 8 mmHg and closed our midline incision with a running 0 nonabsorbable V-Loc suture using 2 or 3 sutures to close the defect in a running fashion. The hernia defect was closed primarily and without any tension. We then measured our space and measured a 15 cm wide x 22 cm vertical space to place our mesh. We then took a piece of Bard soft mesh and cut it down to a 20 x 15 cm piece of mesh. We then placed that into the abdominal cavity and then placed that into the retrorectus plane. We sutured it to the anterior abdominal wall superiorly, inferiorly, and right and left lateral portions and also a central fixation stitch to make sure that the mesh was adherent to the anterior abdominal wall with 0 Ethibond sutures. The mesh was adherent and was in good position. We then closed our retrorectus posterior rectus fascia with 2-0 absorbable V-Loc suture using about 6 sutures to close the entire defect with the mesh covered by the posterior layer. We had removed all of our needles from the abdominal cavity and mesh was in good position. There were no holes in the peritoneum or the retrorectus plane. The mesh was completely covered. We were satisfied with our operation. We then desufflated the abdominal cavity, undocked the [employer] robot, removed our trocars and then closed the skin with 4-0 Monocryl and  Dermabond to complete the operation. Dr. Nicky Stovall was present and scrubbed during the entire operation. The counts were correct.         Marin Quezada MD      [id number]  D:  10/15/2020 12:45  T:  10/15/2020 16:23  JOB #:  [id number]

## 2020-10-15 NOTE — ROUTINE PROCESS
Patient: Jesús Duong MRN: [id number]  SSN: [id number]   YOB: 1975  Age: 39 y.o. Sex: female     Patient is status post Procedure(s):  XI ROBOTIC INCISIONAL HERNIA REPAIR WITH MESH.     Surgeon(s) and Role:     Sonia Kessler MD - Primary    Local/Dose/Irrigation:                    Peripheral IV 10/15/20 Right Forearm (Active)   Site Assessment Clean, dry, & intact 10/15/20 0633   Phlebitis Assessment 0 10/15/20 0633   Infiltration Assessment 0 10/15/20 0633   Dressing Status Clean, dry, & intact 10/15/20 0633   Dressing Type Transparent 10/15/20 0633   Hub Color/Line Status Pink 10/15/20 0633            Airway - Endotracheal Tube 10/15/20 Oral (Active)                   Dressing/Packing:  Wound Abdomen Trocar sites x 3, stab wound x 1-Dressing Type: Topical skin adhesive/glue (10/15/20 1100)    Splint/Cast:  ]    Other:

## 2020-10-15 NOTE — ANESTHESIA POSTPROCEDURE EVALUATION
Procedure(s):  XI ROBOTIC INCISIONAL HERNIA REPAIR WITH MESH. general    Anesthesia Post Evaluation        Patient location during evaluation: PACU  Patient participation: complete - patient participated  Level of consciousness: awake  Pain management: adequate  Airway patency: patent  Anesthetic complications: no  Cardiovascular status: hemodynamically stable  Respiratory status: acceptable  Hydration status: acceptable  Comments: I have seen and evaluated the patient. The patient is ready for PACU discharge. Griselda Lopez, DO                         INITIAL Post-op Vital signs:   Vitals Value Taken Time   BP 97/63 10/15/2020  1:00 PM   Temp 36.7 °C (98 °F) 10/15/2020 12:53 PM   Pulse 79 10/15/2020  1:13 PM   Resp 8 10/15/2020  1:13 PM   SpO2 100 % 10/15/2020  1:13 PM   Vitals shown include unvalidated device data.

## 2020-10-16 LAB
ANION GAP SERPL CALC-SCNC: 9 MMOL/L (ref 5–15)
BUN SERPL-MCNC: 11 MG/DL (ref 6–20)
BUN/CREAT SERPL: 20 (ref 12–20)
CALCIUM SERPL-MCNC: 8.9 MG/DL (ref 8.5–10.1)
CHLORIDE SERPL-SCNC: 104 MMOL/L (ref 97–108)
CO2 SERPL-SCNC: 26 MMOL/L (ref 21–32)
CREAT SERPL-MCNC: 0.54 MG/DL (ref 0.55–1.02)
ERYTHROCYTE [DISTWIDTH] IN BLOOD BY AUTOMATED COUNT: 12.9 % (ref 11.5–14.5)
GLUCOSE BLD STRIP.AUTO-MCNC: 192 MG/DL (ref 65–100)
GLUCOSE BLD STRIP.AUTO-MCNC: 260 MG/DL (ref 65–100)
GLUCOSE BLD STRIP.AUTO-MCNC: 269 MG/DL (ref 65–100)
GLUCOSE BLD STRIP.AUTO-MCNC: 276 MG/DL (ref 65–100)
GLUCOSE SERPL-MCNC: 178 MG/DL (ref 65–100)
HCT VFR BLD AUTO: 28.7 % (ref 35–47)
HGB BLD-MCNC: 9.6 G/DL (ref 11.5–16)
MCH RBC QN AUTO: 28.4 PG (ref 26–34)
MCHC RBC AUTO-ENTMCNC: 33.4 G/DL (ref 30–36.5)
MCV RBC AUTO: 84.9 FL (ref 80–99)
NRBC # BLD: 0 K/UL (ref 0–0.01)
NRBC BLD-RTO: 0 PER 100 WBC
PLATELET # BLD AUTO: 240 K/UL (ref 150–400)
PMV BLD AUTO: 11.2 FL (ref 8.9–12.9)
POTASSIUM SERPL-SCNC: 3.6 MMOL/L (ref 3.5–5.1)
RBC # BLD AUTO: 3.38 M/UL (ref 3.8–5.2)
SERVICE CMNT-IMP: ABNORMAL
SODIUM SERPL-SCNC: 139 MMOL/L (ref 136–145)
WBC # BLD AUTO: 7.5 K/UL (ref 3.6–11)

## 2020-10-16 PROCEDURE — 96376 TX/PRO/DX INJ SAME DRUG ADON: CPT

## 2020-10-16 PROCEDURE — 74011250636 HC RX REV CODE- 250/636: Performed by: SURGERY

## 2020-10-16 PROCEDURE — 74011250637 HC RX REV CODE- 250/637: Performed by: SURGERY

## 2020-10-16 PROCEDURE — 82962 GLUCOSE BLOOD TEST: CPT

## 2020-10-16 PROCEDURE — 80048 BASIC METABOLIC PNL TOTAL CA: CPT

## 2020-10-16 PROCEDURE — 96374 THER/PROPH/DIAG INJ IV PUSH: CPT

## 2020-10-16 PROCEDURE — 99218 HC RM OBSERVATION: CPT

## 2020-10-16 PROCEDURE — 85027 COMPLETE CBC AUTOMATED: CPT

## 2020-10-16 PROCEDURE — 36415 COLL VENOUS BLD VENIPUNCTURE: CPT

## 2020-10-16 PROCEDURE — 74011636637 HC RX REV CODE- 636/637: Performed by: SURGERY

## 2020-10-16 RX ORDER — OXYCODONE AND ACETAMINOPHEN 5; 325 MG/1; MG/1
1-2 TABLET ORAL
Qty: 30 TAB | Refills: 0 | Status: SHIPPED | OUTPATIENT
Start: 2020-10-16 | End: 2020-10-19

## 2020-10-16 RX ORDER — IBUPROFEN 800 MG/1
800 TABLET ORAL
Qty: 30 TAB | Refills: 0 | Status: SHIPPED | OUTPATIENT
Start: 2020-10-16

## 2020-10-16 RX ADMIN — Medication 10 ML: at 05:54

## 2020-10-16 RX ADMIN — OXYCODONE HYDROCHLORIDE AND ACETAMINOPHEN 1 TABLET: 5; 325 TABLET ORAL at 01:50

## 2020-10-16 RX ADMIN — OXYCODONE HYDROCHLORIDE AND ACETAMINOPHEN 1 TABLET: 5; 325 TABLET ORAL at 08:47

## 2020-10-16 RX ADMIN — INSULIN LISPRO 5 UNITS: 100 INJECTION, SOLUTION INTRAVENOUS; SUBCUTANEOUS at 16:59

## 2020-10-16 RX ADMIN — ENOXAPARIN SODIUM 40 MG: 40 INJECTION SUBCUTANEOUS at 08:46

## 2020-10-16 RX ADMIN — KETOROLAC TROMETHAMINE 15 MG: 30 INJECTION, SOLUTION INTRAMUSCULAR at 23:59

## 2020-10-16 RX ADMIN — OXYCODONE HYDROCHLORIDE AND ACETAMINOPHEN 2 TABLET: 5; 325 TABLET ORAL at 21:21

## 2020-10-16 RX ADMIN — KETOROLAC TROMETHAMINE 15 MG: 30 INJECTION, SOLUTION INTRAMUSCULAR at 05:54

## 2020-10-16 RX ADMIN — KETOROLAC TROMETHAMINE 15 MG: 30 INJECTION, SOLUTION INTRAMUSCULAR at 12:42

## 2020-10-16 RX ADMIN — INSULIN LISPRO 5 UNITS: 100 INJECTION, SOLUTION INTRAVENOUS; SUBCUTANEOUS at 12:40

## 2020-10-16 RX ADMIN — INSULIN LISPRO 3 UNITS: 100 INJECTION, SOLUTION INTRAVENOUS; SUBCUTANEOUS at 22:12

## 2020-10-16 RX ADMIN — KETOROLAC TROMETHAMINE 15 MG: 30 INJECTION, SOLUTION INTRAMUSCULAR at 18:41

## 2020-10-16 RX ADMIN — SODIUM CHLORIDE, SODIUM LACTATE, POTASSIUM CHLORIDE, AND CALCIUM CHLORIDE 75 ML/HR: 600; 310; 30; 20 INJECTION, SOLUTION INTRAVENOUS at 21:20

## 2020-10-16 RX ADMIN — INSULIN LISPRO 2 UNITS: 100 INJECTION, SOLUTION INTRAVENOUS; SUBCUTANEOUS at 07:12

## 2020-10-16 NOTE — PROGRESS NOTES
Bedside shift change report given to Pako Hudson RN (oncoming nurse) by Cassi Villaseñor RN (offgoing nurse). Report included the following information SBAR and Kardex.

## 2020-10-16 NOTE — PROGRESS NOTES
[employer] [facility]    POD #1    Subjective     Having some pain but mostly controlled, no N/V, OOB some    Objective     Patient Vitals for the past 24 hrs:   Temp Pulse Resp BP SpO2   10/16/20 0438 98.2 °F (36.8 °C) 67 16 113/60 96 %   10/15/20 2253 98.2 °F (36.8 °C) 80 16 109/74 97 %   10/15/20 1946 97.9 °F (36.6 °C) 73 16 105/62 95 %   10/15/20 1823 98.3 °F (36.8 °C) 88 16 109/70 98 %   10/15/20 1725 98.1 °F (36.7 °C) (!) 101 16 112/74 97 %   10/15/20 1630 97.8 °F (36.6 °C) 98 16 104/70 96 %   10/15/20 1514 97.9 °F (36.6 °C) 100 16 134/82 96 %   10/15/20 1438 -- 93 14 -- 95 %   10/15/20 1430 -- 96 10 112/68 97 %   10/15/20 1425 -- 91 9 -- 100 %   10/15/20 1415 -- (!) 102 12 109/76 100 %   10/15/20 1400 -- 87 12 113/72 99 %   10/15/20 1350 -- 96 13 -- 100 %   10/15/20 1345 -- 88 8 127/73 100 %   10/15/20 1330 -- (!) 110 14 131/76 98 %   10/15/20 1320 99 °F (37.2 °C) (!) 105 14 -- 100 %   10/15/20 1315 -- 78 9 116/64 100 %   10/15/20 1255 -- 80 9 112/66 100 %   10/15/20 1253 98 °F (36.7 °C) 80 20 (!) 114/52 100 %   10/15/20 1250 -- 76 11 (!) 114/52 100 %   10/15/20 1247 -- 75 13 -- 100 %   10/15/20 1246 -- -- -- 103/62 100 %       Date 10/15/20 0700 - 10/16/20 0659 10/16/20 0700 - 10/17/20 0659   Shift 5760-7732 3908-1052 24 Hour Total 4376-5651 6284-8838 24 Hour Total   INTAKE   I.V.(mL/kg/hr) 2250(2.6)  2250(1.3)        I.V. 250  250        Volume (lactated Ringers infusion) 2000 2000      Shift Total(mL/kg) 2250(30.6)  2250(30.6)      OUTPUT   Urine(mL/kg/hr) 500(0.6) 300(0.3) 800(0.5) 450  450     Urine Voided  300 300 450  450     Urine Occurrence(s) 1 x  1 x        Urine Output 500  500      Blood 5  5        Estimated Blood Loss 5  5      Shift Total(mL/kg) 505(6.9) 300(4.1) 805(11) 450(6.1)  450(6.1)   NET 1745 -300 1445 -450  -450   Weight (kg) 73.5 73.5 73.5 73.5 73.5 73.5       PE  Pulm - CTAB  CV - RRR  Abd - soft, ND, BS present, incisions c/d/i    Labs  Recent Results (from the past 12 hour(s))   GLUCOSE, POC    Collection Time: 10/15/20 10:26 PM   Result Value Ref Range    Glucose (POC) 170 (H) 65 - 100 mg/dL    Performed by [facility]., BASIC    Collection Time: 10/16/20  5:56 AM   Result Value Ref Range    Sodium 139 136 - 145 mmol/L    Potassium 3.6 3.5 - 5.1 mmol/L    Chloride 104 97 - 108 mmol/L    CO2 26 21 - 32 mmol/L    Anion gap 9 5 - 15 mmol/L    Glucose 178 (H) 65 - 100 mg/dL    BUN 11 6 - 20 MG/DL    Creatinine 0.54 (L) 0.55 - 1.02 MG/DL    BUN/Creatinine ratio 20 12 - 20      GFR est AA >60 >60 ml/min/1.73m2    GFR est non-AA >60 >60 ml/min/1.73m2    Calcium 8.9 8.5 - 10.1 MG/DL   CBC W/O DIFF    Collection Time: 10/16/20  5:56 AM   Result Value Ref Range    WBC 7.5 3.6 - 11.0 K/uL    RBC 3.38 (L) 3.80 - 5.20 M/uL    HGB 9.6 (L) 11.5 - 16.0 g/dL    HCT 28.7 (L) 35.0 - 47.0 %    MCV 84.9 80.0 - 99.0 FL    MCH 28.4 26.0 - 34.0 PG    MCHC 33.4 30.0 - 36.5 g/dL    RDW 12.9 11.5 - 14.5 %    PLATELET 987 825 - 855 K/uL    MPV 11.2 8.9 - 12.9 FL    NRBC 0.0 0  WBC    ABSOLUTE NRBC 0.00 0.00 - 0.01 K/uL   GLUCOSE, POC    Collection Time: 10/16/20  6:25 AM   Result Value Ref Range    Glucose (POC) 192 (H) 65 - 100 mg/dL    Performed by Javier Pendleton is a 39 y. o.yr old female s/p robotic incisional hernia repair with mesh    Plan     Doing well today  OOB more today  Abdominal binder to give support  Continue IVF for now, may turn them off later  Continue toradol for pain  Needs a little more time to recover from a big hernia surgery    Vinod Davidson MD complains of pain/discomfort

## 2020-10-16 NOTE — PROGRESS NOTES
Bedside shift change report given to [address] [place] [address] (oncoming nurse) by Real Benson (offgoing nurse). Report included the following information SBAR, Kardex and MAR.

## 2020-10-16 NOTE — DISCHARGE INSTRUCTIONS
[id number] [facility] at [address] [place] [address] Operative Instructions      Please call your surgeons  office for a 2 week follow-up appointment with Dr Jayy Rea . Office address is: Edward Ignacio  JASON/ Austin Trimble Tata Romero, [address] [place] [address]  [phone]      Discharge Instructions: You may resume your usual diet as well as your usual medications. You are not cleared to drive if you are taking narcotic pain medication. If you are only using tylenol for pain and are comfortable sitting in a car, you may drive. No heavy lifting. No strenuous exercise. You are cleared to go up and down stairs. You may shower but no baths or swimming. Your incisions dont need any special care. You can wash them gently with  warm soap and water daily and pat them dry. Your stitches are under the skin and dont need to be removed. Ask you doctor when you can return to work. Call our office at  [phone] to make a 2 week follow up appointment. Call our office with any problems, questions or concerns. Call our office if you have any     Fevers of 101 or higher   Worsening pain  Nausea/Vomiting  Difficulty eating or drinking  Incisions that are red, open, draining or tender. Patient Education        Reparación de hernia abdominal: Janel Prince en el hogar  Abdominal Hernia Repair: What to Expect at Home  Quijano recuperación  [address] [place] [address] para reparar quijano hernia, es probable que sienta dolor corine algunos días. También podría sentirse erlin si [place] gripe, y podría tener un poco de [place], estar fatigado y [place]. [place] es común. Debería sentirse mejor después de unos días y es probable que se sienta mucho mejor en 7 días. Corine varias semanas, podría sentir punzadas o tirones en la reparación de la hernia al [employer]. Es posible que tenga algunos moretones alrededor de la adela de la reparación de la hernia.  [place] es normal.  Elizabeth Herron hoja de [employer] idea general del tiempo que le llevará recuperarse. Sin embargo, cada persona se recupera a un ritmo diferente. Siga los pasos que se mencionan a continuación para recuperarse lo más rápido posible. ¿Cómo puede cuidarse en el hogar? Actividad    · Descanse cuando se sienta fatigado. Dormir lo suficiente le ayudará a recuperarse.     · Intente caminar todos los días. Comience caminando un poco más de lo que caminó el día anterior. Poco a poco, aumente la distancia. Caminar mejora el flujo sanguíneo y [place] a prevenir la neumonía y el estreñimiento.     · Si hopkins médico le da christianne faja abdominal para que utilice, hágalo según las indicaciones. Esta consiste en un vendaje elástico que se enrolla alrededor del abdomen y de la parte superior de la cadera. Ayuda a sostener los músculos del abdomen después de la cirugía.     · Evite actividades extenuantes, erlin montar en bicicleta, trotar, levantar pesas o hacer ejercicios aeróbicos, hasta que hopkins médico lo autorice.     · Evite levantar objetos que pudieran implicar un esfuerzo. [place] podría incluir bolsas de compras pesadas y recipientes para Philip Manna maletines pesados, bolsas de arena para excrementos de matilde o alimentos para melvin, christianne aspiradora o un radha.     · Pregúntele a hopkins médico cuándo podrá volver a conducir.     · La mayoría de las personas pueden volver a trabajar 1 o 2 semanas después de la cirugía. [place] si hopkins trabajo implica levantar objetos pesados o hacer actividades extenuantes, es posible que necesite ausentarse del ANNECY 4 y 6 semanas.     · Puede ducharse entre 24 y 50 horas después de la [place], si hopkins médico lo Alicia Ditty. Seque el ancelmo (incisión) con toques suaves de toalla. No tome un baño de inmersión en christianne ksenia corine las primeras 2 semanas o hasta que hopkins médico lo apruebe.     · Pregúntele a hopkins médico cuándo [address].    Alimentación    · Puede continuar con hopkins alimentación normal. Si tiene [employer], coma alimentos suaves bajos en grasa, erlin arroz sin condimentar, carmel a la randell, pan soraida y yogur.     · Radha abundantes líquidos (a menos que quijano médico le indique lo contrario).     · William después de la cirugía, puede notar que leoncio evacuaciones intestinales no son regulares. [place] es común. Trate de evitar el estreñimiento y de no hacer esfuerzos cuando evacua el intestino. Quizás le [employer] candie un suplemento de Stas Braga. Si no carvajal evacuado el intestino después de un par de días, pregúntele a quijano médico si puede candie un laxante suave. Medicamentos    · Quijano médico le dirá si puede volver a candie leoncio medicamentos y cuándo puede volver a hacerlo. También le dará indicaciones sobre cualquier medicamento nuevo que deba candie usted.     · Si selvin aspirina o cualquier otro medicamento que previene los coágulos de [place], pregúntele a quijano médico si debería volver a tomarlo y en qué momento. Asegúrese de entender exactamente lo que quijano médico quiere que vinay.     · Sea gavin con los medicamentos. Holden los analgésicos (medicamentos para el dolor) exactamente según las indicaciones. ? Si el médico le recetó un analgésico, tómelo según las indicaciones. ? Si no está tomando un analgésico recetado, pregúntele a quijano médico si puede candie ilya de [employer].     · Si quijano médico le recetó antibióticos, tómelos según las indicaciones. No deje de tomarlos por el hecho de sentirse mejor. Debe candie todos los antibióticos hasta terminarlos.     · Si le parece que el analgésico le produce revoltura estomacal:  ? Holden el analgésico después de las comidas (a menos que quijano médico le haya indicado lo contrario). ? [place] al médico un analgésico diferente. Cuidado de la incisión    · Si le patten puesto tiras de cinta adhesiva sobre el ancelmo (incisión) que hizo el médico, déjeselas corine christianne semana o hasta que se caigan.  O siga las indicaciones de quijano médico para quitarse la cinta.     · Si le cerraron el ancelmo con grapas, tendrá que visitar al médico en 1 o 2 semanas para que se las quiten.     · Lave la adela a diario con agua jabonosa tibia y séquela con toques suaves de toalla. No use agua oxigenada ni alcohol, ya que pueden retardar la cicatrización. Puede cubrir la adela con christianne venda de gasa si supura o roza contra la ropa. Cámbiese la [employer] [place] [employer] días. Otras instrucciones    · Sostenga christianne almohada sobre la incisión al toser o respirar profundamente. [place] sostendrá hopkins estómago y reducirá el dolor.     · Yudi Augusto de respiración en el hogar erlin le indicó hopkins médico. [place] le ayudará a prevenir christianne neumonía.     · Si le patten hecho christianne cirugía laparoscópica, también puede sentir dolor en el hombro kesha. Por lo general, el dolor dura alrededor de [employer]. La atención de seguimiento es christianne parte clave de hopkins tratamiento y seguridad. Asegúrese de hacer y acudir a todas las citas, y llame a hopkins médico si está teniendo problemas. También es christianne buena idea saber los resultados de leoncio exámenes y mantener christianne lista de los medicamentos que selvin. ¿Cuándo debe pedir ayuda? Llame al 911 en cualquier momento que considere que necesita atención de Alexis. Por ejemplo, llame si:    · Se desmayó (perdió el conocimiento).   · Siente dolor repentino en el pecho y falta de aire, o si tose joselito.     · Siente dolor intenso en el abdomen. Llame a hopkins médico ahora mismo o busque atención médica inmediata si:    · Siente el estómago revuelto y no puede retener líquidos en el estómago.     · Tiene señales de un coágulo de Eduardo, erlin:  ? Dolor en la pantorrilla, detrás de la rodilla, en el muslo o en la alton. ? Enrojecimiento e hinchazón en la pierna o la alton.     · Tiene signos de infección, tales erlin:  ? Aumento del dolor, la hinchazón, el enrojecimiento o la temperatura. ? Vetas rojizas que salen de la incisión. ? Pus que supura de la incisión.   ? Isaac [place].     · Tiene problemas para orinar o evacuar el intestino, en especial si tiene dolor leve o hinchazón en la parte inferior del abdomen.     · La venda sobre la herida está empapada en joselito de color santoro vivo. Preste especial atención a los cambios en hopkins mark y asegúrese de comunicarse con hopkins médico si:    · La hinchazón empeora.     · La hinchazón no disminuye.     · Aún no evacua el intestino después de candie un laxante. ¿Dónde puede encontrar más información en inglés? Marily Chavez a http://james-katty.info/  Escriba B577 en la búsqueda para aprender más acerca de \"Reparación de hernia abdominal: Qué esperar en el hogar. \"  Revisado: 15 mariiil, 2020               Versión del contenido: 12.6  © 0736-5768 Healthwise, Incorporated. Las instrucciones de cuidado fueron adaptadas bajo licencia por [employer] (which disclaims liability or warranty for this information). Si usted tiene [place]y [place] afección médica o sobre estas instrucciones, siempre pregunte a hopkins profesional de mark. Healthwise, Incorporated niega toda garantía o responsabilidad por hopkins uso de esta información.

## 2020-10-16 NOTE — PROGRESS NOTES
STEVEN: Home when stable    Reason for Admission:   Incisional hernia                   RUR Score:     N/a due to obs                Plan for utilizing home health:     Not indicated     PCP: First and Last name:  [facility]   Name of Practice:    Are you a current patient: Yes/No:    Approximate date of last visit:    Can you participate in a virtual visit with your PCP:                     Current Advanced Directive/Advance Care Plan: not interested at this time                         Transition of Care Plan:  CM spoke with patient with the assistance of the [facility] , Mercy Ashraf. Assessment completed. Patient goes to the [facility] for her medical care. No other needs at this time. Care Management Interventions  PCP Verified by CM: Yes([facility])  Palliative Care Criteria Met (RRAT>21 & CHF Dx)?: No  Mode of Transport at Discharge: Self  MyChart Signup: No  Discharge Durable Medical Equipment: No  Health Maintenance Reviewed: Yes  Physical Therapy Consult: No  Occupational Therapy Consult: No  Speech Therapy Consult: No  Current Support Network: Own Home  Confirm Follow Up Transport: Family  Discharge Location  Discharge Placement: Home    Pearl Shultz

## 2020-10-16 NOTE — PROGRESS NOTES
Provided remote interpreting to pt and Maria De Jesus CARR.     Jaydon [employer]  [phone]

## 2020-10-16 NOTE — PROGRESS NOTES
Bedside and Verbal shift change report given to Carlos Fletcher RN (oncoming nurse) by Camilla Zhao RN (offgoing nurse). Report included the following information SBAR, Kardex, Intake/Output, MAR and Recent Results.

## 2020-10-17 LAB
GLUCOSE BLD STRIP.AUTO-MCNC: 147 MG/DL (ref 65–100)
GLUCOSE BLD STRIP.AUTO-MCNC: 159 MG/DL (ref 65–100)
GLUCOSE BLD STRIP.AUTO-MCNC: 230 MG/DL (ref 65–100)
GLUCOSE BLD STRIP.AUTO-MCNC: 263 MG/DL (ref 65–100)
SERVICE CMNT-IMP: ABNORMAL

## 2020-10-17 PROCEDURE — 99218 HC RM OBSERVATION: CPT

## 2020-10-17 PROCEDURE — 96372 THER/PROPH/DIAG INJ SC/IM: CPT

## 2020-10-17 PROCEDURE — 74011636637 HC RX REV CODE- 636/637: Performed by: SURGERY

## 2020-10-17 PROCEDURE — 82962 GLUCOSE BLOOD TEST: CPT

## 2020-10-17 PROCEDURE — 96376 TX/PRO/DX INJ SAME DRUG ADON: CPT

## 2020-10-17 PROCEDURE — 74011250636 HC RX REV CODE- 250/636: Performed by: SURGERY

## 2020-10-17 PROCEDURE — 74011250637 HC RX REV CODE- 250/637: Performed by: SURGERY

## 2020-10-17 RX ORDER — DOCUSATE SODIUM 100 MG/1
100 CAPSULE, LIQUID FILLED ORAL DAILY
Status: DISCONTINUED | OUTPATIENT
Start: 2020-10-18 | End: 2020-10-18 | Stop reason: HOSPADM

## 2020-10-17 RX ADMIN — SODIUM CHLORIDE, SODIUM LACTATE, POTASSIUM CHLORIDE, AND CALCIUM CHLORIDE 75 ML/HR: 600; 310; 30; 20 INJECTION, SOLUTION INTRAVENOUS at 21:28

## 2020-10-17 RX ADMIN — ENOXAPARIN SODIUM 40 MG: 40 INJECTION SUBCUTANEOUS at 09:17

## 2020-10-17 RX ADMIN — KETOROLAC TROMETHAMINE 15 MG: 30 INJECTION, SOLUTION INTRAMUSCULAR at 06:52

## 2020-10-17 RX ADMIN — INSULIN LISPRO 2 UNITS: 100 INJECTION, SOLUTION INTRAVENOUS; SUBCUTANEOUS at 21:28

## 2020-10-17 RX ADMIN — OXYCODONE HYDROCHLORIDE AND ACETAMINOPHEN 1 TABLET: 5; 325 TABLET ORAL at 13:14

## 2020-10-17 RX ADMIN — OXYCODONE HYDROCHLORIDE AND ACETAMINOPHEN 2 TABLET: 5; 325 TABLET ORAL at 21:28

## 2020-10-17 RX ADMIN — OXYCODONE HYDROCHLORIDE AND ACETAMINOPHEN 1 TABLET: 5; 325 TABLET ORAL at 03:45

## 2020-10-17 RX ADMIN — KETOROLAC TROMETHAMINE 15 MG: 30 INJECTION, SOLUTION INTRAMUSCULAR at 13:14

## 2020-10-17 RX ADMIN — INSULIN LISPRO 2 UNITS: 100 INJECTION, SOLUTION INTRAVENOUS; SUBCUTANEOUS at 16:30

## 2020-10-17 RX ADMIN — Medication 10 ML: at 13:15

## 2020-10-17 RX ADMIN — OXYCODONE HYDROCHLORIDE AND ACETAMINOPHEN 1 TABLET: 5; 325 TABLET ORAL at 09:18

## 2020-10-17 RX ADMIN — OXYCODONE HYDROCHLORIDE AND ACETAMINOPHEN 1 TABLET: 5; 325 TABLET ORAL at 17:08

## 2020-10-17 RX ADMIN — INSULIN LISPRO 2 UNITS: 100 INJECTION, SOLUTION INTRAVENOUS; SUBCUTANEOUS at 06:52

## 2020-10-17 RX ADMIN — INSULIN LISPRO 3 UNITS: 100 INJECTION, SOLUTION INTRAVENOUS; SUBCUTANEOUS at 13:14

## 2020-10-17 NOTE — PROGRESS NOTES
Observation notice provided in writing to patient and/or caregiver as well as verbal explanation of the policy. Patients who are in outpatient status also receive the Observation notice.       DARI Bailon/GARRICK

## 2020-10-17 NOTE — PROGRESS NOTES
Progress Note    Patient: Yara Salcedo MRN: [id number]  SSN: [id number]    YOB: 1975  Age: 39 y.o. Sex: female      Admit Date: 10/15/2020    2 Days Post-Op    Procedure:  Procedure(s):  XI ROBOTIC INCISIONAL HERNIA REPAIR WITH MESH    Subjective:     No acute surgical issues. Pt reported incisional pain. No nausea or vomiting. Passing flatus and having BM.     Objective:     Visit Vitals  /81 (BP 1 Location: Left arm, BP Patient Position: At rest)   Pulse 66   Temp 97.7 °F (36.5 °C)   Resp 18   Wt 162 lb (73.5 kg)   SpO2 97%   Breastfeeding No   BMI 29.16 kg/m²       Temp (24hrs), Av.1 °F (36.7 °C), Min:97.7 °F (36.5 °C), Max:98.5 °F (36.9 °C)        Physical Exam:    Gen:  NAD  Pulm:  Unlabored  Abd:  S/moderately distended/appropriate TTP  Wound:  C/D/I    Recent Results (from the past 24 hour(s))   GLUCOSE, POC    Collection Time: 10/16/20  4:53 PM   Result Value Ref Range    Glucose (POC) 276 (H) 65 - 100 mg/dL    Performed by [address] [place] [address], POC    Collection Time: 10/16/20  9:12 PM   Result Value Ref Range    Glucose (POC) 269 (H) 65 - 100 mg/dL    Performed by Fani Kline    GLUCOSE, POC    Collection Time: 10/17/20  6:11 AM   Result Value Ref Range    Glucose (POC) 159 (H) 65 - 100 mg/dL    Performed by ERMA VALLES [facility]  PCT    GLUCOSE, POC    Collection Time: 10/17/20 11:28 AM   Result Value Ref Range    Glucose (POC) 230 (H) 65 - 100 mg/dL    Performed by Nasir Angel          Assessment:     Hospital Problems  Date Reviewed: 2020          Codes Class Noted POA    Incisional hernia ICD-10-CM: K43.2  ICD-9-CM: 553.21  10/15/2020 Unknown              Plan/Recommendations/Medical Decision Making:     - Diet as tolerated  - Pain control  - Continue abdominal binder  - Plan DC home tomorrow

## 2020-10-18 VITALS
OXYGEN SATURATION: 97 % | HEART RATE: 65 BPM | RESPIRATION RATE: 16 BRPM | SYSTOLIC BLOOD PRESSURE: 101 MMHG | TEMPERATURE: 98.2 F | DIASTOLIC BLOOD PRESSURE: 66 MMHG | BODY MASS INDEX: 29.16 KG/M2 | WEIGHT: 162 LBS

## 2020-10-18 LAB
GLUCOSE BLD STRIP.AUTO-MCNC: 184 MG/DL (ref 65–100)
SERVICE CMNT-IMP: ABNORMAL

## 2020-10-18 PROCEDURE — 74011250637 HC RX REV CODE- 250/637: Performed by: SURGERY

## 2020-10-18 PROCEDURE — 99218 HC RM OBSERVATION: CPT

## 2020-10-18 PROCEDURE — 96372 THER/PROPH/DIAG INJ SC/IM: CPT

## 2020-10-18 PROCEDURE — 74011636637 HC RX REV CODE- 636/637: Performed by: SURGERY

## 2020-10-18 PROCEDURE — 74011250636 HC RX REV CODE- 250/636: Performed by: SURGERY

## 2020-10-18 PROCEDURE — 82962 GLUCOSE BLOOD TEST: CPT

## 2020-10-18 RX ADMIN — INSULIN LISPRO 2 UNITS: 100 INJECTION, SOLUTION INTRAVENOUS; SUBCUTANEOUS at 07:09

## 2020-10-18 RX ADMIN — OXYCODONE HYDROCHLORIDE AND ACETAMINOPHEN 1 TABLET: 5; 325 TABLET ORAL at 07:09

## 2020-10-18 RX ADMIN — DOCUSATE SODIUM 100 MG: 100 CAPSULE, LIQUID FILLED ORAL at 09:21

## 2020-10-18 RX ADMIN — ENOXAPARIN SODIUM 40 MG: 40 INJECTION SUBCUTANEOUS at 09:21

## 2020-10-18 NOTE — PROGRESS NOTES
Progress Note    Patient: Ko Huffman MRN: [id number]  SSN: [id number]    YOB: 1975  Age: 39 y.o. Sex: female      Admit Date: 10/15/2020    3  Days Post-Op    Procedure:  Procedure(s):  XI ROBOTIC INCISIONAL HERNIA REPAIR WITH MESH    Subjective:     No acute surgical issues. Pt is tolerating diet. No nausea or vomiting. Passing flatus and having BM. Pain is under control.     Objective:     Visit Vitals  /66 (BP 1 Location: Left arm, BP Patient Position: At rest)   Pulse 65   Temp 98.2 °F (36.8 °C)   Resp 16   Wt 162 lb (73.5 kg)   SpO2 97%   Breastfeeding No   BMI 29.16 kg/m²       Temp (24hrs), Av.9 °F (36.6 °C), Min:97.4 °F (36.3 °C), Max:98.2 °F (36.8 °C)        Physical Exam:    Gen:  NAD  Pulm:  Unlabored  Abd:  S/moderately distended/appropriate TTP  Wound:  C/D/I    Recent Results (from the past 24 hour(s))   GLUCOSE, POC    Collection Time: 10/17/20 11:28 AM   Result Value Ref Range    Glucose (POC) 230 (H) 65 - 100 mg/dL    Performed by Katja Sunshine, POC    Collection Time: 10/17/20  4:36 PM   Result Value Ref Range    Glucose (POC) 147 (H) 65 - 100 mg/dL    Performed by [address], POC    Collection Time: 10/17/20  9:10 PM   Result Value Ref Range    Glucose (POC) 263 (H) 65 - 100 mg/dL    Performed by [facility]ALLES [facility]  PCT    GLUCOSE, POC    Collection Time: 10/18/20  6:34 AM   Result Value Ref Range    Glucose (POC) 184 (H) 65 - 100 mg/dL    Performed by [facility]  PCT          Assessment:     Hospital Problems  Date Reviewed: 2020          Codes Class Noted POA    Incisional hernia ICD-10-CM: K43.2  ICD-9-CM: 553.21  10/15/2020 Unknown              Plan/Recommendations/Medical Decision Making:     - Diet as tolerated  - Pain control  - Continue abdominal binder  - Plan DC today

## 2020-10-18 NOTE — PROGRESS NOTES
[id number] Patient educated with discharge instructions and Rx. Patient verbalized understanding with adequate teach back. Patient signed copy of the discharge instructions that were then placed on the hard chart.

## 2020-11-02 ENCOUNTER — OFFICE VISIT (OUTPATIENT)
Dept: SURGERY | Age: 45
End: 2020-11-02
Payer: SUBSIDIZED

## 2020-11-02 VITALS
HEIGHT: 63 IN | TEMPERATURE: 99.1 F | OXYGEN SATURATION: 98 % | RESPIRATION RATE: 16 BRPM | BODY MASS INDEX: 28.21 KG/M2 | DIASTOLIC BLOOD PRESSURE: 74 MMHG | SYSTOLIC BLOOD PRESSURE: 112 MMHG | HEART RATE: 76 BPM | WEIGHT: 159.2 LBS

## 2020-11-02 DIAGNOSIS — K43.2 INCISIONAL HERNIA, WITHOUT OBSTRUCTION OR GANGRENE: ICD-10-CM

## 2020-11-02 PROCEDURE — 99024 POSTOP FOLLOW-UP VISIT: CPT | Performed by: NURSE PRACTITIONER

## 2020-11-02 RX ORDER — POLYETHYLENE GLYCOL 3350 17 G/17G
17 POWDER, FOR SOLUTION ORAL DAILY
Qty: 255 G | Refills: 0 | Status: SHIPPED | OUTPATIENT
Start: 2020-11-02

## 2020-11-02 RX ORDER — ACETAMINOPHEN 500 MG
500-1000 TABLET ORAL
Qty: 45 TAB | Refills: 0 | Status: SHIPPED | OUTPATIENT
Start: 2020-11-02

## 2020-11-02 NOTE — PROGRESS NOTES
1. Have you been to the ER, urgent care clinic since your last visit? Hospitalized since your last visit? No    2. Have you seen or consulted any other health care providers outside of the [address] [place] [address] since your last visit? Include any pap smears or colon screening.  no

## 2020-11-02 NOTE — PROGRESS NOTES
Subjective:    Zelalem Lopes is a 39 y.o. female presents for postop care 18 days following robotic retrorectus incisional hernia repair with mesh and lysis of adhesions greater than 90 minutes by Dr. Sonny Son. Appetite is good. Eating a regular diet  without difficulty. Bowel movements are  constipated. Pain is controlled with current analgesics. Medication(s) being used: ibuprofen (OTC), narcotic analgesics including oxycodone/acetaminophen (Percocet, Roxicet, Tylox). . Denies fever, nausea, shortness of breath, chest pain, redness at incision site, vomiting and diarrhea  Pt is wearing abd binder all the time; feels better with it on. Pt nervous about taking Percocet, does not want a refill. Used  Emilee  Objective:     Visit Vitals  /74   Pulse 76   Temp 99.1 °F (37.3 °C) (Oral)   Resp 16   Ht 5' 2.5\" (1.588 m)   Wt 159 lb 3.2 oz (72.2 kg)   LMP 07/14/2020   SpO2 98%   BMI 28.65 kg/m²       General:  alert, no distress   Abdomen: soft, bowel sounds active, non-tender   Incision:   healing well, no drainage, no erythema, no seroma, appropriate post op swelling, no dehiscence, incisions well approximated   Heart: regular rate and rhythm, S1, S2 normal, no murmur, click, rub or gallop   Lungs: clear to auscultation bilaterally       Assessment:     incisional hernia status post repair. Doing well postoperatively. Plan:     1. No lifting more than 10-15 lbs. Abd binder - continue to wear it with activity and at rest. Over the next 1-2 weeks, may wean down to wearing it with activity. 2. Follow-up in 2 weeks. 3. Wound care discussed  4, /does not want any more percocet because of how it feels. Has enough ibuprofen at home. Will send in rx for Miralax and extra strength tylenol    Ms. Cole Starks has a reminder for a \"due or due soon\" health maintenance. I have asked that she contact her primary care provider for follow-up on this health maintenance.     Patient verbalized understanding and agreement.

## 2020-11-02 NOTE — PATIENT INSTRUCTIONS
Reparación de hernia abdominal: Qué esperar en el hogar Abdominal Hernia Repair: What to Expect at [facility] recuperación Después de la cirugía para reparar hopkins hernia, es probable que sienta dolor davide algunos días. También podría sentirse erlin si [place] gripe, y podría tener un poco de [place], estar fatigado y [place]. [place] es común. Debería sentirse mejor después de unos días y es probable que se sienta mucho mejor en 7 días. Davide varias semanas, podría sentir punzadas o tirones en la reparación de la hernia al [employer]. Es posible que tenga algunos moretones alrededor de la adela de la reparación de la hernia. [place] es normal. 
Esta hoja de [employer] idea general del tiempo que le llevará recuperarse. Sin embargo, cada persona se recupera a un ritmo diferente. Siga los pasos que se mencionan a continuación para recuperarse lo más rápido posible. Cómo puede cuidarse en el hogar? Actividad 
  · Descanse cuando se sienta fatigado. Dormir lo suficiente le ayudará a recuperarse.  
  · Intente caminar todos los días. Comience caminando un poco más de lo que caminó el día anterior. Poco a poco, aumente la distancia. Caminar mejora el flujo sanguíneo y [place] a prevenir la neumonía y el estreñimiento.  
  · Si hopkins médico le da christianne faja abdominal para que utilice, hágalo según las indicaciones. Esta consiste en un vendaje elástico que se enrolla alrededor del abdomen y de la parte superior de la cadera. Ayuda a sostener los músculos del abdomen después de la cirugía.  
  · Evite actividades extenuantes, erlin montar en bicicleta, trotar, levantar pesas o hacer ejercicios aeróbicos, hasta que hopkins médico lo autorice.  
  · Evite levantar objetos que pudieran implicar un esfuerzo. [place] podría incluir bolsas de compras pesadas y recipientes para Gina August pesados, bolsas de arena para excrementos de matilde o alimentos para melvin, christianne aspiradora o un radha.   · Pregúntele a quijano médico cuándo podrá volver a conducir.  
  · La mayoría de las personas pueden volver a trabajar 1 o 2 semanas después de la cirugía. [place] si quijano trabajo implica levantar objetos pesados o hacer actividades extenuantes, es posible que necesite ausentarse del ANNECY 4 y 6 semanas.  
  · Puede ducharse entre 24 y 50 horas después de la [place], si quijano médico lo Jenny Vann. Seque el ancelmo (incisión) con toques suaves de toalla. No tome un baño de inmersión en christianne ksenia corine las primeras 2 semanas o hasta que quijano médico lo apruebe.  
  · Pregúntele a quijano médico cuándo [address] Alimentación 
  · Puede continuar con quijano alimentación normal. Si tiene malestar estomacal, coma alimentos suaves bajos en grasa, erlin arroz sin condimentar, carmel a la randell, pan soraida y yogur.  
  · Radha abundantes líquidos (a menos que quijano médico le indique lo contrario).  
  · [place] después de la cirugía, puede notar que leoncio evacuaciones intestinales no son regulares. [place] es común. [place] de evitar el estreñimiento y de no hacer esfuerzos cuando evacua el intestino. Quizás le [employer] candie un suplemento de Stas Braga. Si no carvajal evacuado el intestino después de un par de días, pregúntele a quijano médico si puede candie un laxante suave. Medicamentos 
  · Quijano médico le dirá si puede volver a candie leoncio medicamentos y cuándo puede volver a hacerlo. También le dará indicaciones sobre cualquier medicamento nuevo que deba candie usted.  
  · Si selvin aspirina o cualquier otro medicamento que previene los coágulos de [place], pregúntele a quijano médico si debería volver a tomarlo y en qué momento. Asegúrese de entender exactamente lo que quijano médico quiere que vinay.  
  · Sea gavin con los medicamentos. Veyo los analgésicos (medicamentos para el dolor) exactamente según las indicaciones. ? Si el médico le recetó un analgésico, tómelo según las indicaciones. ? Si no está tomando un analgésico recetado, pregúntele a hopkins médico si puede candie ilya de [employer].  
  · Si hopkins médico le recetó antibióticos, tómelos según las indicaciones. No deje de tomarlos por el hecho de sentirse mejor. Debe candie todos los antibióticos hasta terminarlos.  
  · Si le parece que el analgésico le produce revoltura estomacal: ? [place] el analgésico después de las comidas (a menos que hopkins médico le haya indicado lo contrario). ? [place] al médico un analgésico diferente. Cuidado de la incisión 
  · Si le patten puesto tiras de cinta adhesiva sobre el ancelmo (incisión) que hizo el médico, déjeselas corine christianne semana o hasta que se caigan. O siga las indicaciones de hopkins médico para quitarse la cinta.  
  · Si le cerraron el ancelmo con grapas, tendrá que visitar al médico en 1 o 2 semanas para que se las quiten.  
  · Lave la adela a diario con agua jabonosa tibia y séquela con toques suaves de toalla. No use agua oxigenada ni alcohol, ya que pueden retardar la cicatrización. Puede cubrir la adela con christianne venda de gasa si supura o roza contra la ropa. Cámbiese la [employer] días. Otras instrucciones 
  · Sostenga christianne almohada sobre la incisión al toser o respirar profundamente. [place] sostendrá hopkins estómago y reducirá el dolor.  
  · [place] de respiración en el hogar erlin le indicó hopkins médico. [place] le ayudará a prevenir christianne neumonía.  
  · Si le patten hecho christianne cirugía laparoscópica, también puede sentir dolor en el hombro kesha. Por lo general, el dolor dura alrededor de [facility]. La atención de seguimiento es christianne parte clave de hopkins tratamiento y seguridad. Asegúrese de hacer y acudir a todas las citas, y llame a hopkins médico si está teniendo problemas. También es christianne buena idea saber los resultados de leoncio exámenes y mantener christianne lista de los medicamentos que selvin. Cuándo debe pedir ayuda?  
 Llame al 911 en cualquier momento que considere que necesita atención de urgencia. Por ejemplo, llame si: 
  · Se desmayó (perdió el conocimiento).   · Siente dolor repentino en el pecho y falta de aire, o si tose joselito.  
  · Siente dolor intenso en el abdomen. Llame a hopkins médico ahora mismo o busque atención médica inmediata si: 
  · Siente el estómago revuelto y no puede retener líquidos en el estómago.  
  · Tiene señales de un coágulo de [place], erlin: ? Dolor en la pantorrilla, detrás de la rodilla, en el muslo o en la alton. ? Enrojecimiento e hinchazón en la pierna o la alton.  
  · Tiene signos de infección, tales erlin: ? Aumento del dolor, la hinchazón, el enrojecimiento o la temperatura. ? Vetas rojizas que salen de la incisión. ? Pus que supura de la incisión. ? Mehnaz Jones.  
  · Tiene problemas para orinar o evacuar el intestino, en especial si tiene dolor leve o hinchazón en la parte inferior del abdomen.  
  · La venda sobre la herida está empapada en joselito de color santoro vivo. Preste especial atención a los cambios en hopkins mark y asegúrese de comunicarse con hopkins médico si: 
  · La hinchazón empeora.  
  · La hinchazón no disminuye.  
  · Aún no evacua el intestino después de candie un laxante. Dónde puede encontrar más información en inglés? Laila Dhaliwal a http://www.clark.com/ Escriba B577 en la búsqueda para aprender más acerca de \"Reparación de hernia abdominal: Qué esperar en el hogar. \" Revisado: 15 marii, 2020               Versión del contenido: 12.6 © 9739-8496 [employer]wise, Incorporated. Las instrucciones de cuidado fueron adaptadas bajo licencia por [employer] (which disclaims liability or warranty for this information). Si usted tiene [place] afección médica o sobre estas instrucciones, siempre pregunte a hopkins profesional de mark. [facility] niega toda garantía o responsabilidad por hopkins uso de esta información.

## 2020-11-17 ENCOUNTER — OFFICE VISIT (OUTPATIENT)
Dept: SURGERY | Age: 45
End: 2020-11-17
Payer: SUBSIDIZED

## 2020-11-17 VITALS
TEMPERATURE: 98.1 F | OXYGEN SATURATION: 98 % | HEIGHT: 63 IN | DIASTOLIC BLOOD PRESSURE: 83 MMHG | RESPIRATION RATE: 18 BRPM | HEART RATE: 75 BPM | SYSTOLIC BLOOD PRESSURE: 127 MMHG | BODY MASS INDEX: 29.06 KG/M2 | WEIGHT: 164 LBS

## 2020-11-17 DIAGNOSIS — Z09 FOLLOW-UP EXAMINATION AFTER ABDOMINAL SURGERY: Primary | ICD-10-CM

## 2020-11-17 DIAGNOSIS — Z87.19 S/P HERNIA REPAIR: ICD-10-CM

## 2020-11-17 DIAGNOSIS — Z98.890 S/P HERNIA REPAIR: ICD-10-CM

## 2020-11-17 PROCEDURE — 99024 POSTOP FOLLOW-UP VISIT: CPT | Performed by: NURSE PRACTITIONER

## 2020-11-17 NOTE — PROGRESS NOTES
1. Have you been to the ER, urgent care clinic since your last visit? Hospitalized since your last visit? No     2. Have you seen or consulted any other health care providers outside of the [address] [place] [address] since your last visit? Include any pap smears or colon screening.  No

## 2020-11-17 NOTE — PATIENT INSTRUCTIONS
Recommendations after abdominal surgery: -  Avoid heavy lifting and or straining anything > about 25 lbs for another 2 weeks -  Avoid abdominal exercises for another 2 weeks  
 
-  Daily walking and resuming your normal day to day activities is encouraged and as tolerated   
 
-  Ok to bath as normal and you may get in a swimming pool -  Use sun block on exposed skin and ok to moisturize the incisions as desired

## 2020-11-17 NOTE — PROGRESS NOTES
Chief Complaint   Patient presents with    Surgical Follow-up     1 month s/p incisional hernia repair w/ mesh     Russian  used via blue phone for entire encounter     Zelalem Lopes is 4 weeks s/p incisional hernia repair with mesh. Reports she is feeling better and still with some soreness. Ibuprofen is helping. No nausea or vomiting  No fever or chills, chest pain or shortness of breath. BM every day   No dysuria   She does not work outside the home at this time   She has been wearing the binder and it is helpful     Visit Vitals  /83 (BP 1 Location: Left arm, BP Patient Position: Sitting)   Pulse 75   Temp 98.1 °F (36.7 °C) (Oral)   Resp 18   Ht 5' 2.5\" (1.588 m)   Wt 164 lb (74.4 kg)   LMP 07/14/2020   SpO2 98%   BMI 29.52 kg/m²     A + O x 3  Chest  CTA  COR  RRR  ABD Soft, lap sites are well healed, minimal tenderness upper abdomen /ND, +BS, no masses or hernias. EXT No edema; ambulating independently      ICD-10-CM ICD-9-CM    1. Follow-up examination after abdominal surgery  Z09 V67.09    2.  S/P hernia repair  Z98.890 V45.89     Z87.19       Doing well and as expected   Continue to be cautious with lifting and straining   May increase activity as tolerated, but no abdominal exercises and heavy lifting > 25 lbs for another few weeks   Walking and light exercise encouraged   Diet as desired   Discharged from surgical care with prn follow up

## 2022-03-20 PROBLEM — K43.2 INCISIONAL HERNIA: Status: ACTIVE | Noted: 2020-10-15

## 2022-10-20 ENCOUNTER — HOSPITAL ENCOUNTER (OUTPATIENT)
Dept: LAB | Age: 47
Discharge: HOME | End: 2022-10-20

## 2022-10-20 PROCEDURE — 82043 UR ALBUMIN QUANTITATIVE: CPT

## 2022-10-21 LAB
CREAT UR-MCNC: 36.8 MG/DL
MICROALBUMIN UR-MCNC: 1.34 MG/DL
MICROALBUMIN/CREAT UR-RTO: 36 MG/G (ref 0–30)

## 2025-01-30 ENCOUNTER — HOSPITAL ENCOUNTER (OUTPATIENT)
Facility: HOSPITAL | Age: 50
Setting detail: SPECIMEN
Discharge: HOME | End: 2025-02-02

## 2025-01-30 PROCEDURE — 80061 LIPID PANEL: CPT

## 2025-01-30 PROCEDURE — 80053 COMPREHEN METABOLIC PANEL: CPT

## 2025-01-30 PROCEDURE — 86431 RHEUMATOID FACTOR QUANT: CPT

## 2025-01-30 PROCEDURE — 86141 C-REACTIVE PROTEIN HS: CPT

## 2025-01-30 PROCEDURE — 86200 CCP ANTIBODY: CPT

## 2025-01-30 PROCEDURE — 85025 COMPLETE CBC W/AUTO DIFF WBC: CPT

## 2025-01-30 PROCEDURE — 85652 RBC SED RATE AUTOMATED: CPT

## 2025-01-31 LAB
ALBUMIN SERPL-MCNC: 4.2 G/DL (ref 3.5–5)
ALBUMIN/GLOB SERPL: 1.1 (ref 1.1–2.2)
ALP SERPL-CCNC: 135 U/L (ref 45–117)
ALT SERPL-CCNC: 41 U/L (ref 12–78)
ANION GAP SERPL CALC-SCNC: 8 MMOL/L (ref 2–12)
AST SERPL-CCNC: 28 U/L (ref 15–37)
BASOPHILS # BLD: 0.03 K/UL (ref 0–0.1)
BASOPHILS NFR BLD: 0.5 % (ref 0–1)
BILIRUB SERPL-MCNC: 0.5 MG/DL (ref 0.2–1)
BUN SERPL-MCNC: 10 MG/DL (ref 6–20)
BUN/CREAT SERPL: 14 (ref 12–20)
CALCIUM SERPL-MCNC: 10 MG/DL (ref 8.5–10.1)
CHLORIDE SERPL-SCNC: 104 MMOL/L (ref 97–108)
CHOLEST SERPL-MCNC: 177 MG/DL
CO2 SERPL-SCNC: 25 MMOL/L (ref 21–32)
CREAT SERPL-MCNC: 0.71 MG/DL (ref 0.55–1.02)
CRP SERPL HS-MCNC: >9.5 MG/L
DIFFERENTIAL METHOD BLD: ABNORMAL
EOSINOPHIL # BLD: 0.1 K/UL (ref 0–0.4)
EOSINOPHIL NFR BLD: 1.7 % (ref 0–7)
ERYTHROCYTE [DISTWIDTH] IN BLOOD BY AUTOMATED COUNT: 14.9 % (ref 11.5–14.5)
ERYTHROCYTE [SEDIMENTATION RATE] IN BLOOD: 24 MM/HR (ref 0–20)
GLOBULIN SER CALC-MCNC: 3.9 G/DL (ref 2–4)
GLUCOSE SERPL-MCNC: 126 MG/DL (ref 65–100)
HCT VFR BLD AUTO: 43.1 % (ref 35–47)
HDLC SERPL-MCNC: 45 MG/DL
HDLC SERPL: 3.9 (ref 0–5)
HGB BLD-MCNC: 13.1 G/DL (ref 11.5–16)
IMM GRANULOCYTES # BLD AUTO: 0.03 K/UL (ref 0–0.04)
IMM GRANULOCYTES NFR BLD AUTO: 0.5 % (ref 0–0.5)
LDLC SERPL CALC-MCNC: 58 MG/DL (ref 0–100)
LYMPHOCYTES # BLD: 1.33 K/UL (ref 0.8–3.5)
LYMPHOCYTES NFR BLD: 23 % (ref 12–49)
MCH RBC QN AUTO: 26.1 PG (ref 26–34)
MCHC RBC AUTO-ENTMCNC: 30.4 G/DL (ref 30–36.5)
MCV RBC AUTO: 85.9 FL (ref 80–99)
MONOCYTES # BLD: 0.56 K/UL (ref 0–1)
MONOCYTES NFR BLD: 9.7 % (ref 5–13)
NEUTS SEG # BLD: 3.74 K/UL (ref 1.8–8)
NEUTS SEG NFR BLD: 64.6 % (ref 32–75)
NRBC # BLD: 0 K/UL (ref 0–0.01)
NRBC BLD-RTO: 0 PER 100 WBC
PLATELET # BLD AUTO: 228 K/UL (ref 150–400)
PMV BLD AUTO: 12.6 FL (ref 8.9–12.9)
POTASSIUM SERPL-SCNC: 4 MMOL/L (ref 3.5–5.1)
PROT SERPL-MCNC: 8.1 G/DL (ref 6.4–8.2)
RBC # BLD AUTO: 5.02 M/UL (ref 3.8–5.2)
RHEUMATOID FACT SERPL-ACNC: <10 IU/ML
SODIUM SERPL-SCNC: 137 MMOL/L (ref 136–145)
TRIGL SERPL-MCNC: 370 MG/DL
VLDLC SERPL CALC-MCNC: 74 MG/DL
WBC # BLD AUTO: 5.8 K/UL (ref 3.6–11)

## 2025-02-03 LAB — CCP IGA+IGG SERPL IA-ACNC: 3 UNITS (ref 0–19)

## (undated) DEVICE — DRAPE,UTILTY,TAPE,15X26, 4EA/PK: Brand: MEDLINE

## (undated) DEVICE — STERILE POLYISOPRENE POWDER-FREE SURGICAL GLOVES WITH EMOLLIENT COATING: Brand: PROTEXIS

## (undated) DEVICE — GARMENT,MEDLINE,DVT,INT,CALF,MED, GEN2: Brand: MEDLINE

## (undated) DEVICE — SOLUTION IV 1000ML 0.9% SOD CHL

## (undated) DEVICE — REM POLYHESIVE ADULT PATIENT RETURN ELECTRODE: Brand: VALLEYLAB

## (undated) DEVICE — SUT ETHBND 0 36IN SH DA GRN --

## (undated) DEVICE — DERMABOND SKIN ADH 0.7ML -- DERMABOND ADVANCED 12/BX

## (undated) DEVICE — NEEDLE HYPO 22GA L1.5IN BLK S STL HUB POLYPR SHLD REG BVL

## (undated) DEVICE — STRAP,POSITIONING,KNEE/BODY,FOAM,4X60": Brand: MEDLINE

## (undated) DEVICE — OBTRTR BLDELSS 8MM DISP -- DA VINCI - SNGL USE

## (undated) DEVICE — SUTURE MCRYL SZ 4-0 L27IN ABSRB UD L19MM PS-2 1/2 CIR PRIM Y426H

## (undated) DEVICE — TIP COVER ACCESSORY

## (undated) DEVICE — SURGICAL PROCEDURE KIT GEN LAPAROSCOPY LF

## (undated) DEVICE — SUTURE DEV SZ 3-0 V-LOC 90 L12IN TO L18IN CV-23 VLT VLOCM0844

## (undated) DEVICE — KIT DISPOSABLE ACC 4ARM ENDOWRIST DAVINCI

## (undated) DEVICE — TOTAL TRAY, DB, 100% SILI FOLEY, 16FR 10: Brand: MEDLINE

## (undated) DEVICE — TROCAR SITE CLOSURE DEVICE: Brand: ENDO CLOSE

## (undated) DEVICE — PREP SKN CHLRAPRP APL 26ML STR --

## (undated) DEVICE — SUTURE V-LOC SZ 0 L18IN NONABSORBABLE BLU L37MM GS-21 1/2 VLOCN0326

## (undated) DEVICE — INFECTION CONTROL KIT SYS

## (undated) DEVICE — VISUALIZATION SYSTEM: Brand: CLEARIFY

## (undated) DEVICE — PACK,BASIC,SIRUS,V: Brand: MEDLINE